# Patient Record
Sex: MALE | Race: OTHER | NOT HISPANIC OR LATINO | Employment: OTHER | URBAN - METROPOLITAN AREA
[De-identification: names, ages, dates, MRNs, and addresses within clinical notes are randomized per-mention and may not be internally consistent; named-entity substitution may affect disease eponyms.]

---

## 2023-03-21 ENCOUNTER — HOSPITAL ENCOUNTER (INPATIENT)
Facility: HOSPITAL | Age: 78
LOS: 7 days | Discharge: NURSING, REHAB OR LONG-TERM CARE | DRG: 065 | End: 2023-03-28
Attending: INTERNAL MEDICINE | Admitting: INTERNAL MEDICINE
Payer: MEDICARE

## 2023-03-21 ENCOUNTER — HOSPITAL ENCOUNTER (OUTPATIENT)
Dept: TELEMEDICINE | Facility: HOSPITAL | Age: 78
Discharge: HOME | End: 2023-03-21
Payer: MEDICARE

## 2023-03-21 DIAGNOSIS — I63.9 ACUTE CVA (CEREBROVASCULAR ACCIDENT): ICD-10-CM

## 2023-03-21 DIAGNOSIS — I63.9 STROKE: ICD-10-CM

## 2023-03-21 PROCEDURE — 25000003 PHARM REV CODE 250: Performed by: NURSE PRACTITIONER

## 2023-03-21 PROCEDURE — 11000001 HC ACUTE MED/SURG PRIVATE ROOM

## 2023-03-21 PROCEDURE — 80061 LIPID PANEL: CPT | Performed by: NURSE PRACTITIONER

## 2023-03-21 PROCEDURE — 36415 COLL VENOUS BLD VENIPUNCTURE: CPT | Performed by: NURSE PRACTITIONER

## 2023-03-21 PROCEDURE — 83036 HEMOGLOBIN GLYCOSYLATED A1C: CPT | Performed by: NURSE PRACTITIONER

## 2023-03-21 PROCEDURE — 84443 ASSAY THYROID STIM HORMONE: CPT | Performed by: NURSE PRACTITIONER

## 2023-03-21 RX ORDER — ASPIRIN 81 MG/1
81 TABLET ORAL DAILY
Status: DISCONTINUED | OUTPATIENT
Start: 2023-03-22 | End: 2023-03-28 | Stop reason: HOSPADM

## 2023-03-21 RX ORDER — ONDANSETRON 2 MG/ML
4 INJECTION INTRAMUSCULAR; INTRAVENOUS EVERY 6 HOURS PRN
Status: DISCONTINUED | OUTPATIENT
Start: 2023-03-22 | End: 2023-03-28 | Stop reason: HOSPADM

## 2023-03-21 RX ORDER — ENOXAPARIN SODIUM 100 MG/ML
40 INJECTION SUBCUTANEOUS EVERY 24 HOURS
Status: DISCONTINUED | OUTPATIENT
Start: 2023-03-22 | End: 2023-03-28 | Stop reason: HOSPADM

## 2023-03-21 RX ORDER — SODIUM CHLORIDE 0.9 % (FLUSH) 0.9 %
10 SYRINGE (ML) INJECTION
Status: DISCONTINUED | OUTPATIENT
Start: 2023-03-22 | End: 2023-03-28 | Stop reason: HOSPADM

## 2023-03-21 RX ORDER — POLYETHYLENE GLYCOL 3350 17 G/17G
17 POWDER, FOR SOLUTION ORAL DAILY
Status: DISCONTINUED | OUTPATIENT
Start: 2023-03-22 | End: 2023-03-28 | Stop reason: HOSPADM

## 2023-03-21 RX ORDER — CLOPIDOGREL BISULFATE 75 MG/1
75 TABLET ORAL DAILY
Status: DISCONTINUED | OUTPATIENT
Start: 2023-03-22 | End: 2023-03-28 | Stop reason: HOSPADM

## 2023-03-21 RX ORDER — AMOXICILLIN 250 MG
1 CAPSULE ORAL 2 TIMES DAILY
Status: DISCONTINUED | OUTPATIENT
Start: 2023-03-22 | End: 2023-03-28 | Stop reason: HOSPADM

## 2023-03-21 RX ORDER — HYDRALAZINE HYDROCHLORIDE 20 MG/ML
10 INJECTION INTRAMUSCULAR; INTRAVENOUS EVERY 6 HOURS PRN
Status: DISCONTINUED | OUTPATIENT
Start: 2023-03-22 | End: 2023-03-24

## 2023-03-21 RX ORDER — ATORVASTATIN CALCIUM 40 MG/1
40 TABLET, FILM COATED ORAL DAILY
Status: DISCONTINUED | OUTPATIENT
Start: 2023-03-21 | End: 2023-03-28 | Stop reason: HOSPADM

## 2023-03-21 RX ADMIN — ATORVASTATIN CALCIUM 40 MG: 40 TABLET, FILM COATED ORAL at 11:03

## 2023-03-21 NOTE — PROVIDER TRANSFER
Outside Transfer Acceptance Note / [facility]    Referring facility: [facility]   Referring provider: KOFFI TAVARES  Accepting facility: [facility]  Accepting provider: KENNETH LI  Reason for transfer: Higher Level of Care   Transfer diagnosis: Small Vessel Occlusion  Transfer specialty requested: Neurology  Transfer specialty notified: no  Transfer level: NUMBER 1-5: 2  Isolation status: No active isolations   Admission class or status: IP- Inpatient    Narrative     77-year-old M presented to [facility] ED today with left-sided weakness.  He said that he has not seen a physician in over 40 years.    On presentation /113.  On exam there was weakness of the left face, PERNELL and LLE.  CBC and CMP unremarkable.  Troponin and proBNP WNL.  CTH pos for small vessel disease WO acute changes.  CXR clear.  EKG NSR WO acute changes.    Per vascular neurology (Dr. Carmona) patient has had a small vessel occlusion.  TPA not indicated.  Patient given Plavix, aspirin and labetalol with improvement in his BP (211/113 > 154/90).      Transfer diagnosis CVA, hypertensive emergency    Instructions      [facility]  Admit to Hospital Medicine  Upon patient arrival to floor, please contact Hospital Medicine on call.

## 2023-03-22 PROBLEM — I63.9 ACUTE CVA (CEREBROVASCULAR ACCIDENT): Status: ACTIVE | Noted: 2023-03-22

## 2023-03-22 PROBLEM — I16.0 HYPERTENSIVE URGENCY, MALIGNANT: Status: ACTIVE | Noted: 2023-03-22

## 2023-03-22 LAB
ALBUMIN SERPL BCP-MCNC: 3.9 G/DL (ref 3.5–5.2)
ALP SERPL-CCNC: 64 U/L (ref 55–135)
ALT SERPL W/O P-5'-P-CCNC: 17 U/L (ref 10–44)
ANION GAP SERPL CALC-SCNC: 10 MMOL/L (ref 8–16)
AORTIC ROOT ANNULUS: 3.35 CM
ASCENDING AORTA: 3.48 CM
AST SERPL-CCNC: 17 U/L (ref 10–40)
AV INDEX (PROSTH): 0.74
AV MEAN GRADIENT: 9 MMHG
AV PEAK GRADIENT: 15 MMHG
AV REGURGITATION PRESSURE HALF TIME: 646.34 MS
AV VALVE AREA: 2.43 CM2
AV VELOCITY RATIO: 0.69
BASOPHILS # BLD AUTO: 0.04 K/UL (ref 0–0.2)
BASOPHILS NFR BLD: 0.4 % (ref 0–1.9)
BILIRUB SERPL-MCNC: 0.7 MG/DL (ref 0.1–1)
BILIRUB UR QL STRIP: NEGATIVE
BSA FOR ECHO PROCEDURE: 2.02 M2
BUN SERPL-MCNC: 15 MG/DL (ref 8–23)
CALCIUM SERPL-MCNC: 9 MG/DL (ref 8.7–10.5)
CHLORIDE SERPL-SCNC: 110 MMOL/L (ref 95–110)
CHOLEST SERPL-MCNC: 140 MG/DL (ref 120–199)
CHOLEST/HDLC SERPL: 4.5 {RATIO} (ref 2–5)
CLARITY UR: CLEAR
CO2 SERPL-SCNC: 22 MMOL/L (ref 23–29)
COLOR UR: YELLOW
CREAT SERPL-MCNC: 1.1 MG/DL (ref 0.5–1.4)
CV ECHO LV RWT: 0.73 CM
DIFFERENTIAL METHOD: ABNORMAL
DOP CALC AO PEAK VEL: 1.94 M/S
DOP CALC AO VTI: 32.7 CM
DOP CALC LVOT AREA: 3.3 CM2
DOP CALC LVOT DIAMETER: 2.05 CM
DOP CALC LVOT PEAK VEL: 1.34 M/S
DOP CALC LVOT STROKE VOLUME: 79.5 CM3
DOP CALC RVOT PEAK VEL: 1.17 M/S
DOP CALC RVOT VTI: 20.3 CM
DOP CALCLVOT PEAK VEL VTI: 24.1 CM
E WAVE DECELERATION TIME: 217.43 MSEC
E/A RATIO: 0.62
E/E' RATIO: 8 M/S
ECHO LV POSTERIOR WALL: 1.48 CM (ref 0.6–1.1)
EJECTION FRACTION: 60 %
EOSINOPHIL # BLD AUTO: 0 K/UL (ref 0–0.5)
EOSINOPHIL NFR BLD: 0.1 % (ref 0–8)
ERYTHROCYTE [DISTWIDTH] IN BLOOD BY AUTOMATED COUNT: 12.7 % (ref 11.5–14.5)
EST. GFR  (NO RACE VARIABLE): >60 ML/MIN/1.73 M^2
ESTIMATED AVG GLUCOSE: 105 MG/DL (ref 68–131)
FRACTIONAL SHORTENING: 35 % (ref 28–44)
GLUCOSE SERPL-MCNC: 105 MG/DL (ref 70–110)
GLUCOSE UR QL STRIP: NEGATIVE
HBA1C MFR BLD: 5.3 % (ref 4–5.6)
HCT VFR BLD AUTO: 42.2 % (ref 40–54)
HDLC SERPL-MCNC: 31 MG/DL (ref 40–75)
HDLC SERPL: 22.1 % (ref 20–50)
HGB BLD-MCNC: 14.1 G/DL (ref 14–18)
HGB UR QL STRIP: ABNORMAL
HYALINE CASTS #/AREA URNS LPF: 1 /LPF
IMM GRANULOCYTES # BLD AUTO: 0.03 K/UL (ref 0–0.04)
IMM GRANULOCYTES NFR BLD AUTO: 0.3 % (ref 0–0.5)
INR PPP: 1.1 (ref 0.8–1.2)
INTERVENTRICULAR SEPTUM: 1.89 CM (ref 0.6–1.1)
IVC DIAMETER: 1.57 CM
IVRT: 91.34 MSEC
KETONES UR QL STRIP: ABNORMAL
LA MAJOR: 4.91 CM
LA MINOR: 5.19 CM
LA WIDTH: 3.8 CM
LDLC SERPL CALC-MCNC: 80.4 MG/DL (ref 63–159)
LEFT ATRIUM SIZE: 3.7 CM
LEFT ATRIUM VOLUME INDEX: 30 ML/M2
LEFT ATRIUM VOLUME: 60.31 CM3
LEFT INTERNAL DIMENSION IN SYSTOLE: 2.64 CM (ref 2.1–4)
LEFT VENTRICLE DIASTOLIC VOLUME INDEX: 36.29 ML/M2
LEFT VENTRICLE DIASTOLIC VOLUME: 72.95 ML
LEFT VENTRICLE MASS INDEX: 143 G/M2
LEFT VENTRICLE SYSTOLIC VOLUME INDEX: 12.7 ML/M2
LEFT VENTRICLE SYSTOLIC VOLUME: 25.46 ML
LEFT VENTRICULAR INTERNAL DIMENSION IN DIASTOLE: 4.07 CM (ref 3.5–6)
LEFT VENTRICULAR MASS: 287.15 G
LEUKOCYTE ESTERASE UR QL STRIP: NEGATIVE
LV LATERAL E/E' RATIO: 7 M/S
LV SEPTAL E/E' RATIO: 9.33 M/S
LVOT MG: 4.74 MMHG
LVOT MV: 1.07 CM/S
LYMPHOCYTES # BLD AUTO: 1.5 K/UL (ref 1–4.8)
LYMPHOCYTES NFR BLD: 16.3 % (ref 18–48)
MAGNESIUM SERPL-MCNC: 2 MG/DL (ref 1.6–2.6)
MCH RBC QN AUTO: 30.8 PG (ref 27–31)
MCHC RBC AUTO-ENTMCNC: 33.4 G/DL (ref 32–36)
MCV RBC AUTO: 92 FL (ref 82–98)
MICROSCOPIC COMMENT: ABNORMAL
MONOCYTES # BLD AUTO: 0.6 K/UL (ref 0.3–1)
MONOCYTES NFR BLD: 6.7 % (ref 4–15)
MV PEAK A VEL: 0.91 M/S
MV PEAK E VEL: 0.56 M/S
MV STENOSIS PRESSURE HALF TIME: 63.05 MS
MV VALVE AREA P 1/2 METHOD: 3.49 CM2
NEUTROPHILS # BLD AUTO: 6.9 K/UL (ref 1.8–7.7)
NEUTROPHILS NFR BLD: 76.2 % (ref 38–73)
NITRITE UR QL STRIP: NEGATIVE
NONHDLC SERPL-MCNC: 109 MG/DL
NRBC BLD-RTO: 0 /100 WBC
PH UR STRIP: 6 [PH] (ref 5–8)
PHOSPHATE SERPL-MCNC: 2.6 MG/DL (ref 2.7–4.5)
PISA AR MAX VEL: 3.8 M/S
PISA TR MAX VEL: 2.43 M/S
PLATELET # BLD AUTO: 191 K/UL (ref 150–450)
PMV BLD AUTO: 10.2 FL (ref 9.2–12.9)
POTASSIUM SERPL-SCNC: 3.5 MMOL/L (ref 3.5–5.1)
PROT SERPL-MCNC: 7 G/DL (ref 6–8.4)
PROT UR QL STRIP: ABNORMAL
PROTHROMBIN TIME: 11.3 SEC (ref 9–12.5)
PV MEAN GRADIENT: 3.32 MMHG
RA MAJOR: 3.94 CM
RA PRESSURE: 3 MMHG
RA WIDTH: 2.3 CM
RBC # BLD AUTO: 4.58 M/UL (ref 4.6–6.2)
RBC #/AREA URNS HPF: 9 /HPF (ref 0–4)
SODIUM SERPL-SCNC: 142 MMOL/L (ref 136–145)
SP GR UR STRIP: 1.02 (ref 1–1.03)
STJ: 3.5 CM
TDI LATERAL: 0.08 M/S
TDI SEPTAL: 0.06 M/S
TDI: 0.07 M/S
TR MAX PG: 24 MMHG
TRICUSPID ANNULAR PLANE SYSTOLIC EXCURSION: 2 CM
TRIGL SERPL-MCNC: 143 MG/DL (ref 30–150)
TSH SERPL DL<=0.005 MIU/L-ACNC: 1.54 UIU/ML (ref 0.4–4)
TV REST PULMONARY ARTERY PRESSURE: 27 MMHG
URN SPEC COLLECT METH UR: ABNORMAL
UROBILINOGEN UR STRIP-ACNC: NEGATIVE EU/DL
WBC # BLD AUTO: 9.07 K/UL (ref 3.9–12.7)
WBC #/AREA URNS HPF: 2 /HPF (ref 0–5)

## 2023-03-22 PROCEDURE — 84100 ASSAY OF PHOSPHORUS: CPT | Performed by: NURSE PRACTITIONER

## 2023-03-22 PROCEDURE — 97530 THERAPEUTIC ACTIVITIES: CPT

## 2023-03-22 PROCEDURE — 11000001 HC ACUTE MED/SURG PRIVATE ROOM

## 2023-03-22 PROCEDURE — 25000003 PHARM REV CODE 250: Performed by: INTERNAL MEDICINE

## 2023-03-22 PROCEDURE — 25000003 PHARM REV CODE 250: Performed by: NURSE PRACTITIONER

## 2023-03-22 PROCEDURE — 97166 OT EVAL MOD COMPLEX 45 MIN: CPT

## 2023-03-22 PROCEDURE — 81000 URINALYSIS NONAUTO W/SCOPE: CPT | Performed by: NURSE PRACTITIONER

## 2023-03-22 PROCEDURE — 85610 PROTHROMBIN TIME: CPT | Performed by: NURSE PRACTITIONER

## 2023-03-22 PROCEDURE — 36415 COLL VENOUS BLD VENIPUNCTURE: CPT | Performed by: NURSE PRACTITIONER

## 2023-03-22 PROCEDURE — 92610 EVALUATE SWALLOWING FUNCTION: CPT

## 2023-03-22 PROCEDURE — 63600175 PHARM REV CODE 636 W HCPCS: Performed by: NURSE PRACTITIONER

## 2023-03-22 PROCEDURE — 99223 PR INITIAL HOSPITAL CARE,LEVL III: ICD-10-PCS | Mod: ,,, | Performed by: PSYCHIATRY & NEUROLOGY

## 2023-03-22 PROCEDURE — 83735 ASSAY OF MAGNESIUM: CPT | Performed by: NURSE PRACTITIONER

## 2023-03-22 PROCEDURE — 92523 SPEECH SOUND LANG COMPREHEN: CPT

## 2023-03-22 PROCEDURE — 99223 1ST HOSP IP/OBS HIGH 75: CPT | Mod: ,,, | Performed by: PSYCHIATRY & NEUROLOGY

## 2023-03-22 PROCEDURE — 85025 COMPLETE CBC W/AUTO DIFF WBC: CPT | Performed by: NURSE PRACTITIONER

## 2023-03-22 PROCEDURE — 94761 N-INVAS EAR/PLS OXIMETRY MLT: CPT

## 2023-03-22 PROCEDURE — 97162 PT EVAL MOD COMPLEX 30 MIN: CPT

## 2023-03-22 PROCEDURE — 80053 COMPREHEN METABOLIC PANEL: CPT | Performed by: NURSE PRACTITIONER

## 2023-03-22 RX ORDER — TALC
6 POWDER (GRAM) TOPICAL NIGHTLY PRN
Status: DISCONTINUED | OUTPATIENT
Start: 2023-03-22 | End: 2023-03-28 | Stop reason: HOSPADM

## 2023-03-22 RX ORDER — ACETAMINOPHEN 325 MG/1
650 TABLET ORAL EVERY 6 HOURS PRN
Status: DISCONTINUED | OUTPATIENT
Start: 2023-03-22 | End: 2023-03-28 | Stop reason: HOSPADM

## 2023-03-22 RX ORDER — MAG HYDROX/ALUMINUM HYD/SIMETH 200-200-20
30 SUSPENSION, ORAL (FINAL DOSE FORM) ORAL
Status: DISCONTINUED | OUTPATIENT
Start: 2023-03-22 | End: 2023-03-22

## 2023-03-22 RX ORDER — ACETAMINOPHEN 325 MG/1
325 TABLET ORAL EVERY 6 HOURS PRN
COMMUNITY

## 2023-03-22 RX ORDER — LISINOPRIL 20 MG/1
20 TABLET ORAL DAILY
Status: DISCONTINUED | OUTPATIENT
Start: 2023-03-22 | End: 2023-03-23

## 2023-03-22 RX ORDER — SIMETHICONE 80 MG
1 TABLET,CHEWABLE ORAL 3 TIMES DAILY PRN
Status: DISCONTINUED | OUTPATIENT
Start: 2023-03-22 | End: 2023-03-28 | Stop reason: HOSPADM

## 2023-03-22 RX ORDER — MUPIROCIN 20 MG/G
OINTMENT TOPICAL 2 TIMES DAILY
Status: COMPLETED | OUTPATIENT
Start: 2023-03-22 | End: 2023-03-26

## 2023-03-22 RX ORDER — MAG HYDROX/ALUMINUM HYD/SIMETH 200-200-20
30 SUSPENSION, ORAL (FINAL DOSE FORM) ORAL EVERY 6 HOURS PRN
Status: DISCONTINUED | OUTPATIENT
Start: 2023-03-22 | End: 2023-03-28 | Stop reason: HOSPADM

## 2023-03-22 RX ADMIN — LISINOPRIL 20 MG: 20 TABLET ORAL at 04:03

## 2023-03-22 RX ADMIN — MUPIROCIN: 20 OINTMENT TOPICAL at 08:03

## 2023-03-22 RX ADMIN — ENOXAPARIN SODIUM 40 MG: 40 INJECTION SUBCUTANEOUS at 04:03

## 2023-03-22 RX ADMIN — ATORVASTATIN CALCIUM 40 MG: 40 TABLET, FILM COATED ORAL at 09:03

## 2023-03-22 RX ADMIN — MUPIROCIN: 20 OINTMENT TOPICAL at 09:03

## 2023-03-22 RX ADMIN — ASPIRIN 81 MG: 81 TABLET, COATED ORAL at 09:03

## 2023-03-22 RX ADMIN — CLOPIDOGREL BISULFATE 75 MG: 75 TABLET ORAL at 09:03

## 2023-03-22 NOTE — PLAN OF CARE
Discussed poc with pt, pt verbalized understanding    Purposeful rounding every 2hours    VS wnl  Cardiac monitoring in use, pt is NSR, tele monitor # [id number]  Fall precautions in place, remains injury free      Bed locked at lowest position  Call light within reach    Chart check complete  Will cont with POC

## 2023-03-22 NOTE — CONSULTS
Food & Nutrition Education     Diet Education: Cardiac Nutrition Therapy  Time Spent: 15 minutes  Learners: Patient     Nutrition Education provided with handouts:  Cardiac-TLC Nutrition Therapy (nutritioncaremanual.org)     Comments:  RD educated patient on low sodium, general healthful diet r/t recent hospital diagnosis. Recommended a well-balanced diet with a variety of fresh foods, fruits and vegetables (5 cups/day), whole grains (3 oz/day), and fat-free or low fat dairy. Discussed reading food packages, food labels, and nutrition facts labels to identify nutrient content of foods. Discussed the importance of limiting sodium to less than 2,000 mg per day. Recommended salt free seasonings and other herbs and spices in meals to enhance flavor without additional sodium. Discussed dietary sources of cholesterol, the importance of incorporating healthy fats into the diet, and avoiding saturated and trans fats for heart health. For a generally healthy diet, aim for total fat less than 25-35% of calories. Discussed the importance of fiber (especially soluble fiber), dietary sources, and a goal intake of >20-30g/day.      NFPE not performed, pt appears well nourished.  All questions and concerns answered.  Provided handout with dietitian's contact information.  *Please re-consult as needed.  Thank You!  Blair Tovar, Registration Eligible, Provisional LDN

## 2023-03-22 NOTE — ASSESSMENT & PLAN NOTE
Patient has a current diagnosis of Hypertensive emergency with end organ damage evidenced by acute ischemic stroke which is controlled.  Latest blood pressure and vitals reviewed-   Temp:  [97.9 °F (36.6 °C)-98.4 °F (36.9 °C)]   Pulse:  [68-79]   Resp:  [16-18]   BP: (141-199)/()   SpO2:  [93 %-96 %] .   Patient currently off IV antihypertensives.   Home meds for hypertension were reviewed and noted below.       Medication adjustment for hospital antihypertensives is as follows- permissive HTN for now   IV Hydralazine for SBP>220, DBP>110    Will aim for controlled BP reduction by medications noted above. Monitor and mitigate end organ damage as indicated.

## 2023-03-22 NOTE — HOSPITAL COURSE
Surendra Lundy is a 77 year old male who was admitted to [facility] for suspected CVA. Awaiting MRI for confirmation. He was seen by PT/O.T. who recommends dual antiplatelet and STATIN. Hypertension gradually controlled.     MRI brain showed right pontine acute/subacute stroke. Neurology consulted and recommended DAPT for 30 days, then ASA alone, along with his antihypertensive medications.  PT/OT evaluated and recommended rehab placement. Initially refused placement to inpatient facility. He prefers to have outpatient.  We discussed with family the importance of rehab in order to optimize recovery.  After further discussions with family, patient was subsequently agreeable to rehab placement.  Referral initiated, [place] [facility] [place]    3/27  NESSA. Patient with improvement in left sided weakness, rehab placement pending.    3/28  Remains stable, rehab placement approved.  Patient to continue Plavix and ASA x 30 days, till 4/20/23, then continue ASA alone

## 2023-03-22 NOTE — PLAN OF CARE
Pt remains free of injury throughout the shift, safety measures remain in place.   Poc reviewed with pt. Vss, no acute s/s of distress. Medication given as ordered. Hourly rounding done. Chart reviwed, will cont with poc.     Problem: Adjustment to Illness (Stroke, Ischemic/Transient Ischemic Attack)  Goal: Optimal Coping  Outcome: Ongoing, Progressing     Problem: Adult Inpatient Plan of Care  Goal: Optimal Comfort and Wellbeing  Outcome: Ongoing, Progressing     Problem: Bowel Elimination Impaired (Stroke, Ischemic/Transient Ischemic Attack)  Goal: Effective Bowel Elimination  Outcome: Ongoing, Progressing       Problem: Functional Ability Impaired (Stroke, Ischemic/Transient Ischemic Attack)  Goal: Optimal Functional Ability  Outcome: Ongoing, Progressing     Problem: Fall Injury Risk  Goal: Absence of Fall and Fall-Related Injury  Outcome: Ongoing, Progressing     Problem: Infection  Goal: Absence of Infection Signs and Symptoms  Outcome: Ongoing, Progressing

## 2023-03-22 NOTE — ASSESSMENT & PLAN NOTE
Antithrombotics for secondary stroke prevention: Antiplatelets: Aspirin: 81 mg daily  Clopidogrel: 75 mg daily    Statins for secondary stroke prevention and hyperlipidemia, if present:   Statins: Atorvastatin- 40 mg daily    Aggressive risk factor modification: HTN     Rehab efforts: The patient has been evaluated by a stroke team provider and the therapy needs have been fully considered based off the presenting complaints and exam findings. The following therapy evaluations are needed: PT evaluate and treat, OT evaluate and treat, SLP evaluate and treat    Diagnostics ordered/pending: Carotid ultrasound to assess vasculature, HgbA1C to assess blood glucose levels, Lipid Profile to assess cholesterol levels, MRI head without contrast to assess brain parenchyma, TTE to assess cardiac function/status , TSH to assess thyroid function    VTE prophylaxis: Enoxaparin 40 mg SQ every 24 hours    BP parameters: TIA: SBP <220 until imaging confirmation of no infarct     MRI Brain pending.   CTA to assess intracranial involvement  PT/OT recommends rehab  Control blood pressure

## 2023-03-22 NOTE — SUBJECTIVE & OBJECTIVE
Interval History: awaiting MRI. Started on lisinopril 20 mg to gradually control hypertension. Discussed rehab    Review of Systems   Constitutional:  Negative for appetite change, chills, diaphoresis, fatigue and fever.   HENT:  Negative for congestion, nosebleeds, sore throat and trouble swallowing.    Eyes:  Negative for pain, discharge and visual disturbance.   Respiratory:  Negative for apnea, cough, chest tightness, shortness of breath, wheezing and stridor.    Cardiovascular:  Negative for chest pain, palpitations and leg swelling.   Gastrointestinal:  Negative for abdominal distention, abdominal pain, blood in stool, constipation, diarrhea, nausea and vomiting.   Endocrine: Negative for cold intolerance and heat intolerance.   Genitourinary:  Negative for difficulty urinating, dysuria, flank pain, frequency and urgency.   Musculoskeletal:  Negative for arthralgias, back pain, joint swelling, myalgias, neck pain and neck stiffness.   Skin:  Negative for rash and wound.   Allergic/Immunologic: Negative for food allergies and immunocompromised state.   Neurological:  Positive for dizziness, facial asymmetry (left sided facial droop), speech difficulty and weakness (left sided). Negative for seizures, syncope, light-headedness and headaches.   Hematological:  Negative for adenopathy.   Psychiatric/Behavioral:  Negative for agitation, behavioral problems and confusion. The patient is not nervous/anxious.      Objective:     Vital Signs (Most Recent):  Temp: 98.7 °F (37.1 °C) (03/22/23 1205)  Pulse: 70 (03/22/23 1205)  Resp: 18 (03/22/23 1205)  BP: (!) 174/90 (03/22/23 1205)  SpO2: 95 % (03/22/23 1205)   Vital Signs (24h Range):  Temp:  [97.9 °F (36.6 °C)-98.7 °F (37.1 °C)] 98.7 °F (37.1 °C)  Pulse:  [64-79] 70  Resp:  [18] 18  SpO2:  [93 %-96 %] 95 %  BP: (141-185)/(80-95) 174/90     Weight: 83.4 kg (183 lb 13.8 oz)  Body mass index is 26.38 kg/m².    Intake/Output Summary (Last 24 hours) at 3/22/2023 1403  Last  data filed at 3/22/2023 1004  Gross per 24 hour   Intake 120 ml   Output --   Net 120 ml      Physical Exam  Vitals and nursing note reviewed.   Constitutional:       Appearance: He is well-developed.   HENT:      Head: Normocephalic and atraumatic.      Nose: Nose normal.   Eyes:      General: No scleral icterus.  Cardiovascular:      Rate and Rhythm: Normal rate and regular rhythm.      Heart sounds: Normal heart sounds. No murmur heard.    No friction rub. No gallop.   Pulmonary:      Effort: Pulmonary effort is normal. No respiratory distress.      Breath sounds: Normal breath sounds. No wheezing.   Abdominal:      General: Bowel sounds are normal. There is no distension.      Palpations: Abdomen is soft.      Tenderness: There is no abdominal tenderness.   Musculoskeletal:         General: Normal range of motion.      Cervical back: Normal range of motion and neck supple.   Skin:     General: Skin is warm and dry.      Findings: No rash.   Neurological:      Mental Status: He is alert and oriented to person, place, and time.      Motor: Weakness (Left sided MS 4/5 to UE/BLE) present.      Comments: +left facial droop and Left arm drift    Psychiatric:         Behavior: Behavior normal.         Cognition and Memory: Impaired cognition: mild.     Significant Labs: All pertinent labs within the past 24 hours have been reviewed.  CBC:   Recent Labs   Lab 03/22/23  0628   WBC 9.07   HGB 14.1   HCT 42.2        CMP:   Recent Labs   Lab 03/22/23  0628      K 3.5      CO2 22*      BUN 15   CREATININE 1.1   CALCIUM 9.0   PROT 7.0   ALBUMIN 3.9   BILITOT 0.7   ALKPHOS 64   AST 17   ALT 17   ANIONGAP 10       Significant Imaging: I have reviewed all pertinent imaging results/findings within the past 24 hours.

## 2023-03-22 NOTE — PLAN OF CARE
SEE EVAL FOR DETAILS. PT DISPLAYED DEFICITS WITH ADL' SKILLS, DECREASE FUNCTIONAL MOBILITY, DECREASE B UE STRENGTH ENDURANCE  AS WELL AS DECREASE L UE ROM. RECOMMEND : REHAB

## 2023-03-22 NOTE — CONSULTS
[facility]  Neurology  Consult Note    Patient Name: Surendra Lundy  MRN: [id number]  Admission Date: 3/21/2023  Hospital Length of Stay: 1 days  Code Status: Full Code   Attending Provider: Natividad Jung DO   Consulting Provider: Axel Mathis MD  Primary Care Physician: Eric Adler MD  Principal Problem:Acute CVA (cerebrovascular accident)    Inpatient consult to Neurology  Consult performed by: Axel Mathis MD  Consult ordered by: Elif Schuster NP      Subjective:     Chief Complaint:  Slurred speech and left side weakness     HPI: The patient is right handed and states that he became aware of left sided weakness at least 24 hours before seeking medical attention when the weakness and speech difficulty persisted.  He was aware of difficulty with coordinated movement of the left hand, arm, and leg, with the wife also noticing the left facial weakness.    The patient states that he delayed seeking help because he has had some dizziness in the past that would remit after 24 hours, and he assumed that the weakness would also improve.    When first seen in the ER, his BP was 211/113.  He states that he did not know he had high blood pressure, but had not seen a physician for over 40 years.  He was not a TPA candidate because of the time delay.  Carotid ultrasound was normal without evidence of significant occlusion.  MRI is pending at time of this consult.    Past Medical History:   Diagnosis Date    Kidney stones     Low back pain        Past Surgical History:   Procedure Laterality Date    CHOLECYSTECTOMY         Review of patient's allergies indicates:  Not on File    Current Neurological Medications: See list below.      No current facility-administered medications on file prior to encounter.     Current Outpatient Medications on File Prior to Encounter   Medication Sig    acetaminophen (TYLENOL) 325 MG tablet Take 325 mg by mouth every 6 (six) hours as needed for Pain.      Family History        Problem Relation (Age of Onset)    Heart disease Father    Hypertension Brother    No Known Problems Mother          Tobacco Use    Smoking status: Never    Smokeless tobacco: Never   Substance and Sexual Activity    Alcohol use: Yes     Comment: socially    Drug use: Not on file    Sexual activity: Not on file     Review of Systems    ROS:  GENERAL: No fever, chills, fatigability or weight loss.  SKIN: No rashes, itching or changes in color or texture of skin.  HEAD: Denies recent head trauma.  Denies headache.  EYES: Visual acuity fine. No photophobia, ocular pain or diplopia.  EARS: Denies ear pain, discharge or vertigo.  NOSE: No loss of smell, no epistaxis or postnasal drip.  MOUTH & THROAT: No hoarseness . No excessive gum bleeding.  NODES: Denies swollen glands.  CHEST: Denies MIRANDA, cyanosis, wheezing, cough and sputum production.  CARDIOVASCULAR: Denies chest pain, PND, orthopnea or reduced exercise tolerance.  ABDOMEN: Appetite fine. No weight loss. Denies diarrhea, abdominal pain, hematemesis or blood in stool.  URINARY: No flank pain, dysuria or hematuria.  PERIPHERAL VASCULAR: No claudication or cyanosis.  MUSCULOSKELETAL: No joint stiffness or swelling. Denies back pain.  NEUROLOGIC: No history of seizures, or unexplained LOC     Objective:     Vital Signs (Most Recent):  Temp: 98.6 °F (37 °C) (03/22/23 0750)  Pulse: 70 (03/22/23 0750)  Resp: 18 (03/22/23 0750)  BP: (!) 172/85 (03/22/23 0750)  SpO2: (!) 93 % (03/22/23 0750)   Vital Signs (24h Range):  Temp:  [97.9 °F (36.6 °C)-98.6 °F (37 °C)] 98.6 °F (37 °C)  Pulse:  [64-79] 70  Resp:  [16-18] 18  SpO2:  [93 %-96 %] 93 %  BP: (141-199)/() 172/85     Weight: 83.4 kg (183 lb 13.8 oz)  Body mass index is 26.38 kg/m².    Physical Exam  APPEARANCE: Well nourished, well developed, in no acute distress as he sits in chair by side of bed.  HEAD: Normocephalic, atraumatic.  EYES: PERRL. EOMI.  Non-icteric sclerae.    NOSE: Mucosa pink. Airway  clear.  MOUTH & THROAT: No tonsillar enlargement.  Uvula is midline.  NECK: Supple. No bruits.  CHEST: Lungs clear to auscultation.  CARDIOVASCULAR: Regular rhythm without significant murmurs.  MUSCULOSKELETAL:  No bony deformity seen.   NEUROLOGIC:   Mental Status:  The patient is well oriented to person, time, place, and situation.  The patient is attentive to the environment and cooperative for the exam.  Cranial Nerves: II-XII grossly intact. Fundoscopic exam is normal.  No hemorrhage, exudate or papilledema is present. The extraocular muscles are intact in the cardinal directions of gaze.  No ptosis is present. Facial features are asymmetrical with smoothing of the left nasolabial fold and the left forehead.  Forced eye closure shows slight weakness on the left.  Speech is dysarthric. Tongue protrudes in the midline.    Gait and Station:  Patient is confined to chair, but reports falling to the left when standing.  Motor:  He is unable to bring the left arm to shoulder level with clear, immediate down drift.  Manual testing of proximal and distal muscles of the left side demonstrates diffuse weakness.  Sensory:  He reports subjective decrease in light touch, temperature on the left as compared to the right.  Cerebellar:  He could not perform finger to nose on the left.  Finger tap severely impaired on the left. No involuntary movements or tremor seen.  Reflexes:  Stretch reflexes are 2+on the right and 1+ on the left.Plantar stimulation is extensor on the left and down going on the right.       Significant Labs: CBC:   Recent Labs   Lab 03/22/23  0628   WBC 9.07   HGB 14.1   HCT 42.2        CMP:   Recent Labs   Lab 03/22/23  0628         K 3.5      CO2 22*   BUN 15   CREATININE 1.1   CALCIUM 9.0   MG 2.0   PROT 7.0   ALBUMIN 3.9   BILITOT 0.7   ALKPHOS 64   AST 17   ALT 17   ANIONGAP 10     All pertinent lab results from the past 24 hours have been reviewed.    Significant Imaging: I  have reviewed all pertinent imaging results/findings within the past 24 hours.    Assessment and Plan:      Acute stroke, right MCA distribution  Uncontrolled hypertension    RECOMMENDATIONS   Agree with studies ordered, including the MRI brain, but will also need imaging of intracranial vasculature  Consider in patient rehab.  Not certain he can be managed at home by his wife.  Control blood pressure with gradual lowering to 140/80 range.  Agree with ASA/Plavix for 30 days, then ASA 81 mg thereafter    Active Diagnoses:    Diagnosis Date Noted POA    PRINCIPAL PROBLEM:  Acute CVA (cerebrovascular accident) [I63.9] 03/22/2023 Yes    Hypertensive urgency, malignant [I16.0] 03/22/2023 Yes      Problems Resolved During this Admission:       VTE Risk Mitigation (From admission, onward)           Ordered     enoxaparin injection 40 mg  Daily         03/21/23 2327     IP VTE HIGH RISK PATIENT  Once         03/21/23 2327     Place sequential compression device  Until discontinued         03/21/23 2327                    Thank you for your consult. I will follow-up with patient. Please contact us if you have any additional questions.    Axel Mathis MD  Neurology  [facility]

## 2023-03-22 NOTE — ASSESSMENT & PLAN NOTE
Patient has a current diagnosis of Hypertensive emergency with end organ damage evidenced by acute ischemic stroke which is controlled.  Latest blood pressure and vitals reviewed-   Temp:  [97.9 °F (36.6 °C)-98.7 °F (37.1 °C)]   Pulse:  [64-79]   Resp:  [18]   BP: (141-185)/(80-95)   SpO2:  [93 %-96 %] .   Patient currently off IV antihypertensives.   Home meds for hypertension were reviewed and noted below.       Medication adjustment for hospital antihypertensives is as follows- permissive HTN for now   IV Hydralazine for SBP>220, DBP>110    Will aim for controlled BP reduction by medications noted above. Monitor and mitigate end organ damage as indicated.    3/22/23  Okay to start antihypertensives  Lisinopril 20 mg daily

## 2023-03-22 NOTE — PT/OT/SLP EVAL
Physical Therapy Evaluation    Patient Name:  Surendra Lundy   MRN:  [id number]    Recommendations:     Discharge Recommendations: rehabilitation facility   Discharge Equipment Recommendations:  (TBD AT NEXT LEVEL OF CARE)   Barriers to discharge: Decreased caregiver support    Assessment:     Surendra Lundy is a 77 y.o. male admitted with a medical diagnosis of Acute CVA (cerebrovascular accident).  He presents with the following impairments/functional limitations: weakness, impaired endurance, impaired balance, gait instability, decreased lower extremity function, decreased upper extremity function, decreased ROM, impaired self care skills, impaired functional mobility, decreased coordination, decreased safety awareness, abnormal tone, impaired fine motor .    Rehab Prognosis: Good; patient would benefit from acute skilled PT services to address these deficits and reach maximum level of function.    Recent Surgery: * No surgery found *      Plan:     During this hospitalization, patient to be seen 3 x/week to address the identified rehab impairments via gait training, therapeutic activities, therapeutic exercises and progress toward the following goals:    Plan of Care Expires:  04/05/23    Subjective     Chief Complaint: LEFT SIDED WEAKNESS   Patient/Family Comments/goals: INC STRENGTH AND GO HOME   Pain/Comfort:  Pain Rating 1: 0/10  Pain Rating Post-Intervention 1: 0/10    Patients cultural, spiritual, [employer]ism conflicts given the current situation:      Living Environment:   PT LIVES AT HOME WITH WIFE IN A ONE STORY HOME WITH 2 STEPS AND NO RAILING TO ENTER   Prior to admission, patients level of function was IND, DRIVES, AND IS RETIRED.  Equipment used at home: none.  DME owned (not currently used): rolling walker.  Upon discharge, patient will have assistance from UNKNOWN .    Objective:     Communicated with NURSE DAYNEL AND [facility] CHART REVIEW  prior to session.  Patient found supine with peripheral IV,  "telemetry  upon PT entry to room.    General Precautions: Standard, fall  Orthopedic Precautions:N/A   Braces: N/A  Respiratory Status: Room air    Exams:  Cognitive Exam:  Patient is oriented to Person, Place, Time, and Situation  Fine Motor Coordination:    -       Impaired  Left hand thumb/finger opposition skills IMPAIRED  Gross Motor Coordination:  IMPAIRED  Postural Exam:  Patient presented with the following abnormalities:    -       Rounded shoulders  -       Forward head  Sensation:    -       Intact  RLE ROM: WNL  RLE Strength: WNL  LLE ROM: LIMITED  LLE Strength: GROSSLY 3+/5    Functional Mobility:  Bed Mobility:     Supine to Sit: contact guard assistance  Transfers:     Sit to Stand:  contact guard assistance with rolling walker  Bed to Chair: minimum assistance with  rolling walker  using  Stand Pivot  Gait: PT GT TRAINED X 40' WITH RW , CUES FOR SAFETY WITH RW AND CUES FOR INC SWING OF L LE       AM-PAC 6 CLICK MOBILITY  Total Score:16       Treatment & Education:  PT EDUCATED ON ROLE OF P.T. AND ON SAFETY, PT EDUCATED ON RISK FOR FALLS DUE TO GENERALIZED WEAKNESS, EDUCATED ON "CALL DON'T FALL", ENCOURAGED TO CALL FOR ASSISTANCE WITH ALL NEEDS SUCH AS BED<>CHAIR TRANSFERS OR TRIPS TO BATHROOM.  PT COMPLETED SIT>STAND TA WITH EMPHASIS ON T/F TRAINING AND SAFETY WITH UE/ LE STRENGTHENING X 5 REPS WITH SBA.  PT SOILED AND PT SPILLED URINAL ON JEANS HOWEVER DECLINED P.T. TO ASSIST PT OUT OF SOILED CLOTHING AND IN CLEAN GOWN. PT REPORTED HE DIDN'T LIKE GOWNS AND WHEN EDUCATED ON SKIN BREAKDOWN FROM URINE ON SKIN PT REPORTED, " MY SKIN IS TOUGH. "         Patient left up in chair with call button in reach and chair alarm on.    GOALS:   Multidisciplinary Problems       Physical Therapy Goals          Problem: Physical Therapy    Goal Priority Disciplines Outcome Goal Variances Interventions   Physical Therapy Goal     PT, PT/OT      Description: LT23  1. PT WILL COMPLETE BED MOBILITY IND  2. PT WILL " GT TRAIN X 150' WITH RW AND SBA WITH INC L LE SWING   3. PT WILL T/F TO CHAIR WITH RW AND SBA                       History:     Past Medical History:   Diagnosis Date    Kidney stones     Low back pain        Past Surgical History:   Procedure Laterality Date    CHOLECYSTECTOMY         Time Tracking:     PT Received On: 03/22/23  PT Start Time: 0752     PT Stop Time: 0820  PT Total Time (min): 28 min     Billable Minutes: Evaluation 18 and Therapeutic Activity 10      03/22/2023

## 2023-03-22 NOTE — PROGRESS NOTES
[facility] Medicine  Progress Note    Patient Name: Surendra Lundy  MRN: [id number]  Patient Class: IP- Inpatient   Admission Date: 3/21/2023  Length of Stay: 1 days  Attending Physician: Natividad Jung DO  Primary Care Provider: Eric Adler MD    Subjective:     Principal Problem:Acute CVA (cerebrovascular accident)        HPI:  The patient is a 78yo male with hx kidney stones and low back pain who presented to [facility] ED 3/21/23 with left-sided weakness and dizziness. Pt reports on 3/20/23 at appox 3pm, he noticed dizziness and loss of balance. When he awoke this am, he noticed he could barely get OOB due to left sided weakness and loss of balance. He spoke to his son who noticed slurred speech. He then went to ED for evaluation.   He said that he has not seen a physician in over 40 years. Pt reports symptoms improved since arrival to ED.      In the ED, /113. On exam there was weakness of the left face, PERNELL and LLE. CBC and CMP unremarkable.Troponin and proBNP WNL..CTH showed small vessel disease w/o acute changes.CXR clear. EKG NSR w/o ischemic changes.     Per vascular neurology (Dr. Carmona) patient has had a small vessel occlusion. TPA not indicated. The patient was given Plavix, Aspirin and IV Labetalol with improvement in his BP to 154/90.  He was weaned off the Labetalol drip. Vascular neurology recommended permissive HTN for now.   Pt was transferred to [facility] with diagnosis CVA, hypertensive emergency for Neurology evaluation and further work up.     The patient will be placed in observation under hospital medicine      Overview/Hospital Course:  Surendra Lundy is a 77 year old male who was admitted to [facility] for suspected CVA. Awaiting MRI for confirmation. He was seen by PT/O.T. who recommends dual antiplatelet and STATIN. Hypertension gradually controlled.       Interval History: awaiting MRI. Started on lisinopril 20 mg to gradually control hypertension.  Discussed rehab    Review of Systems   Constitutional:  Negative for appetite change, chills, diaphoresis, fatigue and fever.   HENT:  Negative for congestion, nosebleeds, sore throat and trouble swallowing.    Eyes:  Negative for pain, discharge and visual disturbance.   Respiratory:  Negative for apnea, cough, chest tightness, shortness of breath, wheezing and stridor.    Cardiovascular:  Negative for chest pain, palpitations and leg swelling.   Gastrointestinal:  Negative for abdominal distention, abdominal pain, blood in stool, constipation, diarrhea, nausea and vomiting.   Endocrine: Negative for cold intolerance and heat intolerance.   Genitourinary:  Negative for difficulty urinating, dysuria, flank pain, frequency and urgency.   Musculoskeletal:  Negative for arthralgias, back pain, joint swelling, myalgias, neck pain and neck stiffness.   Skin:  Negative for rash and wound.   Allergic/Immunologic: Negative for food allergies and immunocompromised state.   Neurological:  Positive for dizziness, facial asymmetry (left sided facial droop), speech difficulty and weakness (left sided). Negative for seizures, syncope, light-headedness and headaches.   Hematological:  Negative for adenopathy.   Psychiatric/Behavioral:  Negative for agitation, behavioral problems and confusion. The patient is not nervous/anxious.      Objective:     Vital Signs (Most Recent):  Temp: 98.7 °F (37.1 °C) (03/22/23 1205)  Pulse: 70 (03/22/23 1205)  Resp: 18 (03/22/23 1205)  BP: (!) 174/90 (03/22/23 1205)  SpO2: 95 % (03/22/23 1205)   Vital Signs (24h Range):  Temp:  [97.9 °F (36.6 °C)-98.7 °F (37.1 °C)] 98.7 °F (37.1 °C)  Pulse:  [64-79] 70  Resp:  [18] 18  SpO2:  [93 %-96 %] 95 %  BP: (141-185)/(80-95) 174/90     Weight: 83.4 kg (183 lb 13.8 oz)  Body mass index is 26.38 kg/m².    Intake/Output Summary (Last 24 hours) at 3/22/2023 1403  Last data filed at 3/22/2023 1004  Gross per 24 hour   Intake 120 ml   Output --   Net 120 ml       Physical Exam  Vitals and nursing note reviewed.   Constitutional:       Appearance: He is well-developed.   HENT:      Head: Normocephalic and atraumatic.      Nose: Nose normal.   Eyes:      General: No scleral icterus.  Cardiovascular:      Rate and Rhythm: Normal rate and regular rhythm.      Heart sounds: Normal heart sounds. No murmur heard.    No friction rub. No gallop.   Pulmonary:      Effort: Pulmonary effort is normal. No respiratory distress.      Breath sounds: Normal breath sounds. No wheezing.   Abdominal:      General: Bowel sounds are normal. There is no distension.      Palpations: Abdomen is soft.      Tenderness: There is no abdominal tenderness.   Musculoskeletal:         General: Normal range of motion.      Cervical back: Normal range of motion and neck supple.   Skin:     General: Skin is warm and dry.      Findings: No rash.   Neurological:      Mental Status: He is alert and oriented to person, place, and time.      Motor: Weakness (Left sided MS 4/5 to UE/BLE) present.      Comments: +left facial droop and Left arm drift    Psychiatric:         Behavior: Behavior normal.         Cognition and Memory: Impaired cognition: mild.     Significant Labs: All pertinent labs within the past 24 hours have been reviewed.  CBC:   Recent Labs   Lab 03/22/23  0628   WBC 9.07   HGB 14.1   HCT 42.2        CMP:   Recent Labs   Lab 03/22/23  0628      K 3.5      CO2 22*      BUN 15   CREATININE 1.1   CALCIUM 9.0   PROT 7.0   ALBUMIN 3.9   BILITOT 0.7   ALKPHOS 64   AST 17   ALT 17   ANIONGAP 10       Significant Imaging: I have reviewed all pertinent imaging results/findings within the past 24 hours.      Assessment/Plan:      * Acute CVA (cerebrovascular accident)    Antithrombotics for secondary stroke prevention: Antiplatelets: Aspirin: 81 mg daily  Clopidogrel: 75 mg daily    Statins for secondary stroke prevention and hyperlipidemia, if present:   Statins: Atorvastatin- 40  mg daily    Aggressive risk factor modification: HTN     Rehab efforts: The patient has been evaluated by a stroke team provider and the therapy needs have been fully considered based off the presenting complaints and exam findings. The following therapy evaluations are needed: PT evaluate and treat, OT evaluate and treat, SLP evaluate and treat    Diagnostics ordered/pending: Carotid ultrasound to assess vasculature, HgbA1C to assess blood glucose levels, Lipid Profile to assess cholesterol levels, MRI head without contrast to assess brain parenchyma, TTE to assess cardiac function/status , TSH to assess thyroid function    VTE prophylaxis: Enoxaparin 40 mg SQ every 24 hours    BP parameters: TIA: SBP <220 until imaging confirmation of no infarct     MRI Brain pending.   CTA to assess intracranial involvement  PT/OT recommends rehab  Control blood pressure    Hypertensive urgency, malignant  Patient has a current diagnosis of Hypertensive emergency with end organ damage evidenced by acute ischemic stroke which is controlled.  Latest blood pressure and vitals reviewed-   Temp:  [97.9 °F (36.6 °C)-98.7 °F (37.1 °C)]   Pulse:  [64-79]   Resp:  [18]   BP: (141-185)/(80-95)   SpO2:  [93 %-96 %] .   Patient currently off IV antihypertensives.   Home meds for hypertension were reviewed and noted below.       Medication adjustment for hospital antihypertensives is as follows- permissive HTN for now   IV Hydralazine for SBP>220, DBP>110    Will aim for controlled BP reduction by medications noted above. Monitor and mitigate end organ damage as indicated.    3/22/23  Okay to start antihypertensives  Lisinopril 20 mg daily      VTE Risk Mitigation (From admission, onward)         Ordered     enoxaparin injection 40 mg  Daily         03/21/23 2327     IP VTE HIGH RISK PATIENT  Once         03/21/23 2327     Place sequential compression device  Until discontinued         03/21/23 2327                Discharge Planning   MAMTA:       Code Status: Full Code   Is the patient medically ready for discharge?:     Reason for patient still in hospital (select all that apply): Treatment  Discharge Plan A: Home with family          Elif Schuster NP  Department of Hospital Medicine   [facility] Surg

## 2023-03-22 NOTE — PT/OT/SLP EVAL
Occupational Therapy Evaluation and Treatment    Patient Name: Surendra Lundy   MRN: [id number]  Admitting Diagnosis: Acute CVA (cerebrovascular accident)   Recent Surgery: * No surgery found *      Recommendations:     Discharge Recommendations: rehabilitation facility  Level of Assistance Recommended: 24 hours significant assistance  Discharge Equipment Recommendations: prosthesis (tbd at next level of care)  Barriers to discharge:      Assessment:     Surendra Lundy is a 77 y.o. male with a medical diagnosis of Acute CVA (cerebrovascular accident). He presents with performance deficits affecting function including weakness, impaired functional mobility, decreased safety awareness, impaired endurance, gait instability, impaired balance, impaired self care skills, decreased lower extremity function.     Rehab Prognosis: Good; patient would benefit from acute skilled OT services to address these deficits and reach maximum level of function.     Plan:     Patient to be seen 2 x/week to address the above listed problems via self-care/home management, therapeutic activities, therapeutic exercises  Plan of Care Expires: 03/22/23  Plan of Care Reviewed with: patient    Subjective     Chief Complaint: No chief complaint on file.    Patient Comments/Goals:   Pain/Comfort:       Patients cultural, spiritual, Jainism conflicts given the current situation:      Social History:  Living Environment: Patient lives with their spouse in a single story home, number of outside stairs: 2-3 .  Prior Level of Function: Prior to admission, patient was independent with ADLs.  Roles and Routines: OCCUPATIONAL THERAPY  Equipment Used at Home:  none  DME owned (not currently used): wheelchair  Upon discharge, patient will have assistance from facility staff.    Objective:     Communicated with NURSE AND Epic CHART REVIEW prior to session. Patient found HOB elevated with telemetry upon OT entry to room.    General Precautions: Standard,  aspiration   Orthopedic Precautions:N/A   Braces: N/A   Respiratory Status: Room air    Cognitive and Psychosocial Function:  Oriented to: Person, Place, Time, Situation, Person, Place, Time   Follows Commands/Attention: follows two-step commands  Communication: SLIGHTLY SLURRED SPEECH  Memory: No Deficits noted  Safety Awareness/Insight to Disability: impaired   Mood/Affect/Coping skills/Emotional Control: Appropriate to situation    Hearing: Intact    Vision: Intact visual fields    Physical Exam:  Postural examination/scapula alignment:    -       No postural abnormalities identified  Skin integrity: NONE NOTED     Left UE Right UE   UE Edema None noted None noted   UE ROM AAROM WFL AROM WFL   UE Strength 3/5 4/5    Strength fair composite grasp grasp WFL   Sensation LUE INTACT:light/touch RUE INTACT: light/touch   Fine Motor Coordination:  LUE IMPAIRED: hand thumb/finger opposition skills  RUE INTACT: hand thumb/finger opposition skills    Gross Motor Coordination: LUE IMPAIRED: WFL, limited by fatigue and impaired functional endurance RUE INTACT:WFL     Occupational Performance    Bed Mobility:   Rolling/Turning to Left with minimum assistance  Rolling/Turning to Right with minimum assistance  Scooting anteriorly to EOB to have both feet planted on floor: minimum assistance  Supine to sit from right side of bed with minimum assistance  Sit to Supine with minimum assistance on left  side of bed    Functional Mobility/Transfers:  Static Sitting EOB: stand by assistance  Dynamic Sitting EOB: contact guard assistance  Static Standing Balance: minimum assistance    Dynamic Standing Balance: minimum assistance  Sit <> Stand Transfer with minimum assistance with hand-held assist  Functional Mobility: pt req hand held assist ( min a with mod) vc's sequence instruction safety and posture  Activities of Daily Living:  Lower Body Dressing: maximal assistance .    AMPAC 6 Click ADL:  AMPAC Total Score: 18    Treatment  & Education:  Patient educated on role of OT, POC and goals for therapy  Patient educated on importance of OOB activities with staff member assistance and sitting OOB majority of the day.       Patient left HOB elevated with all lines intact, call button in reach, RN notified, bed alarm on, and family present.    GOALS:   Multidisciplinary Problems       Occupational Therapy Goals          Problem: Occupational Therapy    Goal Priority Disciplines Outcome Interventions   Occupational Therapy Goal     OT, PT/OT     Description: O.T. GOALS TO BE MET BY 4-5-23  PT WILL TOLERATE 1 SET X 15 REPS B UE ROM EXERCISE TO INCREASE B UE STRENGTH/ENDURANCE AND L UE ROM  MIN A WITH LE DRESSING  SBA WITH TOILET TRANSFERS                         History:     Past Medical History:   Diagnosis Date    Kidney stones     Low back pain          Past Surgical History:   Procedure Laterality Date    CHOLECYSTECTOMY         Time Tracking:     OT Date of Treatment: 03/22/23  OT Start Time: 1511  OT Stop Time: 1534  OT Total Time (min): 23 min    Billable Minutes: Evaluation 13 MINUTES  and Therapeutic Activity 10 MINUTES

## 2023-03-22 NOTE — SUBJECTIVE & OBJECTIVE
Past Medical History:   Diagnosis Date    Kidney stones     Low back pain        Past Surgical History:   Procedure Laterality Date    CHOLECYSTECTOMY         Review of patient's allergies indicates:  Not on File    No current facility-administered medications on file prior to encounter.     Current Outpatient Medications on File Prior to Encounter   Medication Sig    acetaminophen (TYLENOL) 325 MG tablet Take 325 mg by mouth every 6 (six) hours as needed for Pain.     Family History       Problem Relation (Age of Onset)    Heart disease Father    Hypertension Brother    No Known Problems Mother          Tobacco Use    Smoking status: Never    Smokeless tobacco: Never   Substance and Sexual Activity    Alcohol use: Yes     Comment: socially    Drug use: Not on file    Sexual activity: Not on file     Review of Systems   Constitutional:  Negative for appetite change, chills, diaphoresis, fatigue and fever.   HENT:  Negative for congestion, nosebleeds, sore throat and trouble swallowing.    Eyes:  Negative for pain, discharge and visual disturbance.   Respiratory:  Negative for apnea, cough, chest tightness, shortness of breath, wheezing and stridor.    Cardiovascular:  Negative for chest pain, palpitations and leg swelling.   Gastrointestinal:  Negative for abdominal distention, abdominal pain, blood in stool, constipation, diarrhea, nausea and vomiting.   Endocrine: Negative for cold intolerance and heat intolerance.   Genitourinary:  Negative for difficulty urinating, dysuria, flank pain, frequency and urgency.   Musculoskeletal:  Negative for arthralgias, back pain, joint swelling, myalgias, neck pain and neck stiffness.   Skin:  Negative for rash and wound.   Allergic/Immunologic: Negative for food allergies and immunocompromised state.   Neurological:  Positive for dizziness, facial asymmetry (left sided facial droop), speech difficulty and weakness (left sided). Negative for seizures, syncope, light-headedness  and headaches.   Hematological:  Negative for adenopathy.   Psychiatric/Behavioral:  Negative for agitation, behavioral problems and confusion. The patient is not nervous/anxious.    Objective:     Vital Signs (Most Recent):  Temp: 97.9 °F (36.6 °C) (03/21/23 2359)  Pulse: 79 (03/21/23 2359)  Resp: 18 (03/21/23 2359)  BP: (!) 185/88 (03/21/23 2359)  SpO2: (!) 93 % (03/21/23 2359)   Vital Signs (24h Range):  Temp:  [97.9 °F (36.6 °C)-98.4 °F (36.9 °C)] 97.9 °F (36.6 °C)  Pulse:  [68-79] 79  Resp:  [16-18] 18  SpO2:  [93 %-96 %] 93 %  BP: (141-199)/() 185/88     Weight: 83.4 kg (183 lb 13.8 oz)  Body mass index is 26.38 kg/m².    Physical Exam  Vitals and nursing note reviewed.   Constitutional:       Appearance: He is well-developed.   HENT:      Head: Normocephalic and atraumatic.      Nose: Nose normal.   Eyes:      General: No scleral icterus.  Cardiovascular:      Rate and Rhythm: Normal rate and regular rhythm.      Heart sounds: Normal heart sounds. No murmur heard.    No friction rub. No gallop.   Pulmonary:      Effort: Pulmonary effort is normal. No respiratory distress.      Breath sounds: Normal breath sounds. No wheezing.   Abdominal:      General: Bowel sounds are normal. There is no distension.      Palpations: Abdomen is soft.      Tenderness: There is no abdominal tenderness.   Musculoskeletal:         General: Normal range of motion.      Cervical back: Normal range of motion and neck supple.   Skin:     General: Skin is warm and dry.      Findings: No rash.   Neurological:      Mental Status: He is alert and oriented to person, place, and time.      Motor: Weakness (Left sided MS 4/5 to UE/BLE) present.      Comments: +left facial droop and Left arm drift    Psychiatric:         Behavior: Behavior normal.         Cognition and Memory: Cognition is impaired (mild).           Significant Labs: All pertinent labs within the past 24 hours have been reviewed.    Significant Imaging: I have reviewed  all pertinent imaging results/findings within the past 24 hours.

## 2023-03-22 NOTE — PLAN OF CARE
SW spoke with pt regarding SNF Placement. Pt stated he doesn't want SNF or anything inpatient. Pt stated he wants outpatient PT/OT/ SW will consult with MD regarding this matter.

## 2023-03-22 NOTE — PLAN OF CARE
[facility] - Med Surg  Initial Discharge Assessment       Primary Care Provider: Eric Adler MD    Admission Diagnosis: Stroke [I63.9]  Stroke [I63.9]    Admission Date: 3/21/2023  Expected Discharge Date: per attending         Payor: [employer] MEDICARE / Plan: [employer] MEDICARE HMO / Product Type: Capitation /     Extended Emergency Contact Information  Primary Emergency Contact: Shayna Lundy  Mobile Phone: [phone]  Relation: Spouse  Preferred language: English   needed? No  Secondary Emergency Contact: Nilesh Lundy  Mobile Phone: [phone]  Relation: Son  Preferred language: English   needed? No    Discharge Plan A: Home with family       No Pharmacies Listed    Initial Assessment (most recent)       Adult Discharge Assessment - 03/22/23 1056          Discharge Assessment    Assessment Type Discharge Planning Assessment     Confirmed/corrected address, phone number and insurance Yes     Confirmed Demographics Correct on Facesheet     Source of Information patient     When was your last doctors appointment? --   few years    Communicated MAMTA with patient/caregiver Date not available/Unable to determine     Reason For Admission stroke     Do you have help at home or someone to help you manage your care at home? Yes     Who are your caregiver(s) and their phone number(s)? spouse     Prior to hospitilization cognitive status: Alert/Oriented     Current cognitive status: Alert/Oriented     Equipment Currently Used at Home none     Readmission within 30 days? No     Patient currently being followed by outpatient case management? No     Do you currently have service(s) that help you manage your care at home? No     Do you take prescription medications? Yes     Do you have prescription coverage? Yes     Coverage [employer]     Do you have any problems affording any of your prescribed medications? No     Is the patient taking medications as prescribed? yes     Who is going to help you get home at  discharge? spouse and son     Are you on dialysis? No     Do you take coumadin? No     Discharge Plan A Home with family                 SW to f/u as needed.

## 2023-03-22 NOTE — PT/OT/SLP EVAL
Speech Language Pathology Evaluation  Cognitive/Bedside Swallow    Patient Name:  Surendra Lundy   MRN:  [id number]  Admitting Diagnosis: Acute CVA (cerebrovascular accident)    Recommendations:                  General Recommendations:  Speech/language therapy and Cognitive-linguistic therapy  Diet recommendations:  Regular Diet - IDDSI Level 7, Thin liquids - IDDSI Level 0   Aspiration Precautions: Check for pocketing/oral residue, Frequent oral care, HOB to 90 degrees, Remain upright 30 minutes post meal, Small bites/sips, and Standard aspiration precautions   General Precautions: Standard, aspiration  Communication strategies:   careful listening needed d/t dysarthria    History:     Past Medical History:   Diagnosis Date    Kidney stones     Low back pain        Past Surgical History:   Procedure Laterality Date    CHOLECYSTECTOMY         Social History: Patient lives with wife at home in [place], [place].  Independent with ADL's.  Retired from working at the [id number] in [place].    Prior Intubation HX:  N/A    Modified Barium Swallow: N/A    [place] [facility] N/A on [facility].  MRI pending.    Prior diet: Pt consumes a regular diet.  Denied dysphagia hx or current symptoms of dysphagia.    Subjective     Pt seen bedside for ST evaluation.  Sitting upright in bedside chair.  Urine smell noted in room--pt reportedly spilled urine from urinal on pants but refused to change into gown.  No family present.  No c/o pain.  Pt stated his family members works at a SNF in [place], and he would consider going there--he wants rehab closer to home.  Patient goals: To go home    Pain/Comfort:  Pain Rating 1: 0/10  Pain Rating Post-Intervention 1: 0/10  Pain Rating 2: 0/10  Pain Rating Post-Intervention 2: 0/10    Respiratory Status: Room air    Objective:     Cognitive Status:    Oriented x4.  Lack of deficit awareness and level of assist needed at this time.  Impaired recent memory; however,  "pt reported "I could never remember anything."  Able to meet functional communicative needs.       Receptive Language:   Comprehension:   WFL    Pragmatics:    WFL    Expressive Language:  Verbal:    WFL    Motor Speech:  Mild dysarthria present with left OM weakness/asymmetry    Voice:   WFL    Visual-Spatial:  WFL    Reading:   WFL --words, phrases and sentences, clock    Oral Musculature Evaluation  Oral Musculature: facial asymmetry present, left weakness  Dentition: present and adequate  Secretion Management: adequate  Mucosal Quality: good  Mandibular Strength and Mobility: WFL  Oral Labial Strength and Mobility: impaired coordination, impaired retraction  Lingual Strength and Mobility: WFL  Velar Elevation: WFL  Buccal Strength and Mobility: decreased tone  Volitional Cough: present  Volitional Swallow: present  Voice Prior to PO Intake: WF    Bedside Swallow Eval:   Consistencies Assessed:  Pt consumed lunch meal during bedside CSE, which consisted of IDDSI 7 (regular solids) and IDDSI 0 (thin liquids).  Hamburger, macaroni and cheese, coke    Oral Phase:   Cleared oral/left buccal residue with thin liquid rinse independently.  Slow oral transit time    Pharyngeal Phase:   no overt clinical signs/symptoms of aspiration    Compensatory Strategies  Aspiration precautions  Oral care/hygiene    Treatment:  Pt recommended for continued IDDSI 7 (regular solids) and IDDSI 0 (thin liquids), following aspiration precautions and post-acute/rehab intervention for cognitive-communicative deficits s/p CVA.    Assessment:     Surendra Lundy is a 77 y.o. male with an SLP diagnosis of  left-OM weakness/asymmetry, dysarthria of speech and mild cognitive impairment s/p suspected CVA--MRI pending .  No overt s/s of dysphagia and pt recommended for continued IDDSI 7 (regular solids) with IDDSI 0 (thin liquids), following aspiration precautions and daily oral care/hygiene.  Pt would benefit from continued ST post-acute/rehab " for cognitive-communicative impairment.     Goals:   Multidisciplinary Problems       SLP Goals          Problem: SLP    Goal Priority Disciplines Outcome   SLP Goal     SLP    Description: 1.  Pt will consume IDDSI 7 (regular solids) with IDDSI 0 (thin liquids) without incident.    2.  Pt will improve cognitive-communicative skills to baseline level of function/independence related to speech intelligibility, memory recall and executive functioning.                       Plan:     Patient to be seen:  2 x/week, 3 x/week   Plan of Care expires:  03/29/23  Plan of Care reviewed with:  patient   SLP Follow-Up:  Yes       Discharge recommendations:  Discharge Facility/Level of Care Needs: rehabilitation facility   Barriers to Discharge:  None    Time Tracking:     SLP Treatment Date:   03/22/23  Speech Start Time:  1230  Speech Stop Time:  1300     Speech Total Time (min):  30 min    Billable Minutes: Eval 15 minutes  and Eval Swallow and Oral Function 15 minutes    03/22/2023

## 2023-03-22 NOTE — HPI
The patient is a 76yo male with hx kidney stones and low back pain who presented to [facility] ED 3/21/23 with left-sided weakness and dizziness. Pt reports on 3/20/23 at appox 3pm, he noticed dizziness and loss of balance. When he awoke this am, he noticed he could barely get OOB due to left sided weakness and loss of balance. He spoke to his son who noticed slurred speech. He then went to ED for evaluation.   He said that he has not seen a physician in over 40 years. Pt reports symptoms improved since arrival to ED.      In the ED, /113. On exam there was weakness of the left face, PERNELL and LLE. CBC and CMP unremarkable.Troponin and proBNP WNL..CTH showed small vessel disease w/o acute changes.CXR clear. EKG NSR w/o ischemic changes.     Per vascular neurology (Dr. Carmona) patient has had a small vessel occlusion. TPA not indicated. The patient was given Plavix, Aspirin and IV Labetalol with improvement in his BP to 154/90.  He was weaned off the Labetalol drip. Vascular neurology recommended permissive HTN for now.   Pt was transferred to [facility] with diagnosis CVA, hypertensive emergency for Neurology evaluation and further work up.     The patient will be placed in observation under hospital medicine

## 2023-03-23 LAB
ALBUMIN SERPL BCP-MCNC: 3.8 G/DL (ref 3.5–5.2)
ALP SERPL-CCNC: 67 U/L (ref 55–135)
ALT SERPL W/O P-5'-P-CCNC: 20 U/L (ref 10–44)
ANION GAP SERPL CALC-SCNC: 10 MMOL/L (ref 8–16)
AST SERPL-CCNC: 36 U/L (ref 10–40)
BASOPHILS # BLD AUTO: 0.05 K/UL (ref 0–0.2)
BASOPHILS NFR BLD: 0.6 % (ref 0–1.9)
BILIRUB SERPL-MCNC: 0.8 MG/DL (ref 0.1–1)
BUN SERPL-MCNC: 18 MG/DL (ref 8–23)
CALCIUM SERPL-MCNC: 8.8 MG/DL (ref 8.7–10.5)
CHLORIDE SERPL-SCNC: 111 MMOL/L (ref 95–110)
CO2 SERPL-SCNC: 20 MMOL/L (ref 23–29)
CREAT SERPL-MCNC: 1 MG/DL (ref 0.5–1.4)
DIFFERENTIAL METHOD: NORMAL
EOSINOPHIL # BLD AUTO: 0 K/UL (ref 0–0.5)
EOSINOPHIL NFR BLD: 0.5 % (ref 0–8)
ERYTHROCYTE [DISTWIDTH] IN BLOOD BY AUTOMATED COUNT: 12.7 % (ref 11.5–14.5)
EST. GFR  (NO RACE VARIABLE): >60 ML/MIN/1.73 M^2
GLUCOSE SERPL-MCNC: 108 MG/DL (ref 70–110)
HCT VFR BLD AUTO: 44.4 % (ref 40–54)
HGB BLD-MCNC: 14.8 G/DL (ref 14–18)
IMM GRANULOCYTES # BLD AUTO: 0.02 K/UL (ref 0–0.04)
IMM GRANULOCYTES NFR BLD AUTO: 0.2 % (ref 0–0.5)
LYMPHOCYTES # BLD AUTO: 1.7 K/UL (ref 1–4.8)
LYMPHOCYTES NFR BLD: 20.3 % (ref 18–48)
MCH RBC QN AUTO: 30.7 PG (ref 27–31)
MCHC RBC AUTO-ENTMCNC: 33.3 G/DL (ref 32–36)
MCV RBC AUTO: 92 FL (ref 82–98)
MONOCYTES # BLD AUTO: 0.6 K/UL (ref 0.3–1)
MONOCYTES NFR BLD: 7.6 % (ref 4–15)
NEUTROPHILS # BLD AUTO: 5.9 K/UL (ref 1.8–7.7)
NEUTROPHILS NFR BLD: 70.8 % (ref 38–73)
NRBC BLD-RTO: 0 /100 WBC
PLATELET # BLD AUTO: 197 K/UL (ref 150–450)
PMV BLD AUTO: 10.3 FL (ref 9.2–12.9)
POTASSIUM SERPL-SCNC: 3.3 MMOL/L (ref 3.5–5.1)
PROT SERPL-MCNC: 7.2 G/DL (ref 6–8.4)
RBC # BLD AUTO: 4.82 M/UL (ref 4.6–6.2)
SODIUM SERPL-SCNC: 141 MMOL/L (ref 136–145)
WBC # BLD AUTO: 8.28 K/UL (ref 3.9–12.7)

## 2023-03-23 PROCEDURE — 97530 THERAPEUTIC ACTIVITIES: CPT

## 2023-03-23 PROCEDURE — 63600175 PHARM REV CODE 636 W HCPCS: Performed by: NURSE PRACTITIONER

## 2023-03-23 PROCEDURE — 85025 COMPLETE CBC W/AUTO DIFF WBC: CPT | Performed by: NURSE PRACTITIONER

## 2023-03-23 PROCEDURE — 11000001 HC ACUTE MED/SURG PRIVATE ROOM

## 2023-03-23 PROCEDURE — 25500020 PHARM REV CODE 255: Performed by: INTERNAL MEDICINE

## 2023-03-23 PROCEDURE — 25000003 PHARM REV CODE 250: Performed by: NURSE PRACTITIONER

## 2023-03-23 PROCEDURE — 99232 SBSQ HOSP IP/OBS MODERATE 35: CPT | Mod: ,,, | Performed by: PSYCHIATRY & NEUROLOGY

## 2023-03-23 PROCEDURE — 92507 TX SP LANG VOICE COMM INDIV: CPT

## 2023-03-23 PROCEDURE — 36415 COLL VENOUS BLD VENIPUNCTURE: CPT | Performed by: NURSE PRACTITIONER

## 2023-03-23 PROCEDURE — 80053 COMPREHEN METABOLIC PANEL: CPT | Performed by: NURSE PRACTITIONER

## 2023-03-23 PROCEDURE — 99232 PR SUBSEQUENT HOSPITAL CARE,LEVL II: ICD-10-PCS | Mod: ,,, | Performed by: PSYCHIATRY & NEUROLOGY

## 2023-03-23 PROCEDURE — 97116 GAIT TRAINING THERAPY: CPT

## 2023-03-23 PROCEDURE — 92526 ORAL FUNCTION THERAPY: CPT

## 2023-03-23 RX ORDER — LISINOPRIL 20 MG/1
20 TABLET ORAL ONCE
Status: COMPLETED | OUTPATIENT
Start: 2023-03-23 | End: 2023-03-23

## 2023-03-23 RX ORDER — LISINOPRIL 20 MG/1
40 TABLET ORAL DAILY
Status: DISCONTINUED | OUTPATIENT
Start: 2023-03-24 | End: 2023-03-28 | Stop reason: HOSPADM

## 2023-03-23 RX ADMIN — MUPIROCIN: 20 OINTMENT TOPICAL at 09:03

## 2023-03-23 RX ADMIN — MUPIROCIN: 20 OINTMENT TOPICAL at 08:03

## 2023-03-23 RX ADMIN — ASPIRIN 81 MG: 81 TABLET, COATED ORAL at 09:03

## 2023-03-23 RX ADMIN — ENOXAPARIN SODIUM 40 MG: 40 INJECTION SUBCUTANEOUS at 05:03

## 2023-03-23 RX ADMIN — LISINOPRIL 20 MG: 20 TABLET ORAL at 09:03

## 2023-03-23 RX ADMIN — SENNOSIDES AND DOCUSATE SODIUM 1 TABLET: 50; 8.6 TABLET ORAL at 09:03

## 2023-03-23 RX ADMIN — HYDRALAZINE HYDROCHLORIDE 10 MG: 20 INJECTION, SOLUTION INTRAMUSCULAR; INTRAVENOUS at 04:03

## 2023-03-23 RX ADMIN — ATORVASTATIN CALCIUM 40 MG: 40 TABLET, FILM COATED ORAL at 09:03

## 2023-03-23 RX ADMIN — CLOPIDOGREL BISULFATE 75 MG: 75 TABLET ORAL at 09:03

## 2023-03-23 RX ADMIN — LISINOPRIL 20 MG: 20 TABLET ORAL at 02:03

## 2023-03-23 RX ADMIN — HYDRALAZINE HYDROCHLORIDE 10 MG: 20 INJECTION, SOLUTION INTRAMUSCULAR; INTRAVENOUS at 11:03

## 2023-03-23 RX ADMIN — IOHEXOL 100 ML: 350 INJECTION, SOLUTION INTRAVENOUS at 11:03

## 2023-03-23 RX ADMIN — SENNOSIDES AND DOCUSATE SODIUM 1 TABLET: 50; 8.6 TABLET ORAL at 08:03

## 2023-03-23 NOTE — PT/OT/SLP PROGRESS
Speech Language Pathology Treatment    Patient Name:  Surendra Lundy   MRN:  [id number]  Admitting Diagnosis: Acute CVA (cerebrovascular accident)    Recommendations:                 General Recommendations:  Speech/language therapy and Cognitive-linguistic therapy  Diet recommendations:  Regular Diet - IDDSI Level 7, Liquid Diet Level: Thin liquids - IDDSI Level 0   Aspiration Precautions: Avoid talking while eating, Check for pocketing/oral residue, Frequent oral care, HOB to 90 degrees, Remain upright 30 minutes post meal, Small bites/sips, and Standard aspiration precautions   General Precautions: Standard, aspiration  Communication strategies:   careful listening s/t dysarthria    Subjective     Pt seen bedside for ST.  No c/o pain.  Ready to go home, despite tx recs for IP rehab.  No family present.  Patient goals: to go home    Pain/Comfort:  Pain Rating 1: 0/10  Pain Rating Post-Intervention 1: 0/10  Pain Rating 2: 0/10  Pain Rating Post-Intervention 2: 0/10    Respiratory Status: Room air    Objective:     Has the patient been evaluated by SLP for swallowing?   Yes  Keep patient NPO? No   Current Respiratory Status: room air     Pt consuming IDDSI 7 (regular solids)/IDDSI 0 (thin liquids) without incident.  Educated on swallowing precautions/strategies to maximize safety/efficiency during PO consumption.    Conversational speech tasks--education/demonstration of speech strategies to improve communicative effectiveness in the setting of left sided OM weakness s/t CVA.    MRI BRAIN WITHOUT CONTRAST     CLINICAL HISTORY:  Stroke, follow up;     TECHNIQUE:  Multisequence multiplanar imaging noncontrast     COMPARISON:  None available     FINDINGS:  There is an ischemic infarct right pontine small to moderate size.  No sizable mass effect or hemorrhagic transformation.     Underlying chronic microangiopathic change.     Ventricles nondistended     Impression:     Right pontine infarct acute or subacute         Electronically signed by: Jennifer Rg  Date:                                            03/22/2023  Time:                                           22:12    Assessment:     Surendra Lundy is a 77 y.o. male with an SLP diagnosis of (left) OM weakness/asymmetry, dysarthria of speech and mild cognitive impairment s/p confirmed CVA on MRI .  Pt consuming IDDSI 7 (regular solids) with IDDSI 0 (thin liquids) without incident, following aspiration precautions and daily oral care/hygiene.  Pt would benefit from continued ST post-acute/rehab for cognitive-communicative impairment.      Goals:   Multidisciplinary Problems       SLP Goals          Problem: SLP    Goal Priority Disciplines Outcome   SLP Goal     SLP Ongoing, Progressing   Description: 1.  Pt will consume IDDSI 7 (regular solids) with IDDSI 0 (thin liquids) without incident.    2.  Pt will improve cognitive-communicative skills to baseline level of function/independence related to speech intelligibility, memory recall and executive functioning.                       Plan:     Patient to be seen:  2 x/week, 3 x/week   Plan of Care expires:  03/29/23  Plan of Care reviewed with:  patient   SLP Follow-Up:  Yes       Discharge recommendations:  rehabilitation facility   Barriers to Discharge:  None    Time Tracking:     SLP Treatment Date:   03/23/23  Speech Start Time:  1500  Speech Stop Time:  1530     Speech Total Time (min):  30 min    Billable Minutes: Speech Therapy Individual 15 minutes and Treatment Swallowing Dysfunction 15 minutes    03/23/2023

## 2023-03-23 NOTE — HPI
76 y/o man with no sig PMH presenting with L sided weakness and lightheadness.   He reports symptoms started yesterday at 3 pm and have been stable since then.  Does not take any blood thinners at home.

## 2023-03-23 NOTE — PT/OT/SLP PROGRESS
"Physical Therapy  Treatment    Surendra Lundy   MRN: [id number]   Admitting Diagnosis: Acute CVA (cerebrovascular accident)    PT Received On: 03/23/23  PT Start Time: 0819     PT Stop Time: 0845    PT Total Time (min): 26 min       Billable Minutes:  Gait Training 16 and Therapeutic Activity 10    Treatment Type: Treatment  PT/PTA: PT     Number of PTA visits since last PT visit: 0       General Precautions: Standard, fall  Orthopedic Precautions: N/A  Braces: N/A  Respiratory Status: Room air         Subjective:  Communicated with NURSE GALICIA AND [facility] CHART REVIEW  prior to session.   PT AGREED TO TX     Pain/Comfort  Pain Rating 1: 0/10  Pain Rating Post-Intervention 1: 0/10    Objective:   Patient found with: peripheral IV, telemetry    Functional Mobility:  PT MET IN  SUP IN BED. PT AGREED TO TX HOWEVER REPORTED, " I AM READY TO GO HOME." PT SUP.SIT EOB WITH SBA AND LEFT LATERAL LEAN INITIALLY. PT STOOD WITH RW AND CUES FOR  OF L UE WITH RW. PT GT TRAINED WITH DEC STEP LENGTH OF L LE AND CUES FOR TO PROGRESS GT AND POSTURAL CUES . PT GT TRAINED 3X55' WITH STANDING REST BREAKS. PT RETURNED TO  TO BATHROOM FOR TOILETING WITH CGA ON AND OFF COMMODE AND ASSIST WITH DOFFING SOILED GARMENT AND DONNING CLEAN UNDERGARMENT. PT T/F TO BEDSIDE CHAIR WITH RW AND CGA. PT COMPLETED 2X5 REPS OF SIT >STANDS WITH SBA WITH EMPHASIS ON U LE WB AND SAFETY WITH T/F TRAINING.     Treatment and Education:  PT EDUCATED ON RISK FOR FALLS DUE TO GENERALIZED WEAKNESS, EDUCATED ON "CALL DON'T FALL", ENCOURAGED TO CALL FOR ASSISTANCE WITH ALL NEEDS SUCH AS BED<>CHAIR TRANSFERS OR TRIPS TO BATHROOM.       AM-PAC 6 CLICK MOBILITY  How much help from another person does this patient currently need?   1 = Unable, Total/Dependent Assistance  2 = A lot, Maximum/Moderate Assistance  3 = A little, Minimum/Contact Guard/Supervision  4 = None, Modified [place]/Independent    Turning over in bed (including adjusting bedclothes, sheets and " blankets)?: 4  Sitting down on and standing up from a chair with arms (e.g., wheelchair, bedside commode, etc.): 3  Moving from lying on back to sitting on the side of the bed?: 3  Moving to and from a bed to a chair (including a wheelchair)?: 3  Climbing 3-5 steps with a railing?: 1    AM-PAC Raw Score CMS G-Code Modifier Level of Impairment Assistance   6 % Total / Unable   7 - 9 CM 80 - 100% Maximal Assist   10 - 14 CL 60 - 80% Moderate Assist   15 - 19 CK 40 - 60% Moderate Assist   20 - 22 CJ 20 - 40% Minimal Assist   23 CI 1-20% SBA / CGA   24 CH 0% Independent/ Mod I     Patient left up in chair with call button in reach and chair alarm on.    Assessment:  PT PROGRESSING WITH GT AND GROSS FUNC MOBILITY HOWEVER LEFT SIDE IMPAIRMENTS AND MANAGEMENT OF DAILY ROUTINE SAFELY CONT TO LIMIT TO AND RESULTS AS A HIGH FALL RISK.     Rehab identified problem list/impairments: weakness, gait instability, impaired balance, impaired endurance, decreased coordination, impaired functional mobility, impaired self care skills, decreased safety awareness, decreased lower extremity function, decreased ROM, impaired coordination, decreased upper extremity function    Rehab potential is good.    Activity tolerance: Good    Discharge recommendations: rehabilitation facility      Barriers to discharge:      Equipment recommendations: other (see comments) (TBD AT NEXT LEVEL OF CARE)     GOALS:   Multidisciplinary Problems       Physical Therapy Goals          Problem: Physical Therapy    Goal Priority Disciplines Outcome Goal Variances Interventions   Physical Therapy Goal     PT, PT/OT Ongoing, Progressing     Description: LT23  1. PT WILL COMPLETE BED MOBILITY IND  2. PT WILL GT TRAIN X 150' WITH RW AND SBA WITH INC L LE SWING   3. PT WILL T/F TO CHAIR WITH RW AND SBA                       PLAN:    Patient to be seen 3 x/week to address the above listed problems via gait training, therapeutic activities, therapeutic  exercises, neuromuscular re-education  Plan of Care expires: 04/05/23  Plan of Care reviewed with: patient         03/23/2023

## 2023-03-23 NOTE — SUBJECTIVE & OBJECTIVE
Woke up with symptoms?: {YES NO:58262}    Recent bleeding noted: {Yes / No / Unknown:74}  Does the patient take any Blood Thinners? {Yes / No / Unknown:74}  Medications: {[facility] Medications:42294}      Past Medical History: {[facility] MEDICAL HX:18624}    Past Surgical History: {[facility] SURGICAL HX:43548}    Family History: {[facility] MEDICAL HX:15502}    Social History: {[facility] SOCIAL HX:79213}    Allergies: No Known Allergies {[facility] ALLERGIES:89023}    Review of Systems   Neurological: Positive for weakness and light-headedness.   All other systems reviewed and are negative.    Objective:   Vitals: There were no vitals taken for this visit. {[facility] VITALS:84501}    CT READ: {[facility] CT READ:83177}    Physical Exam  Constitutional:       General: He is not in acute distress.  HENT:      Head: Normocephalic and atraumatic.   Eyes:      Pupils: Pupils are equal, round, and reactive to light.   Pulmonary:      Effort: Pulmonary effort is normal.

## 2023-03-23 NOTE — PLAN OF CARE
Pt remains free of injury throughout the shift, safety measures remain in place.   Poc reviewed with pt. Vss, no acute s/s of distress. Medication given as ordered. PRN medication given for BP. Hourly rounding done. Chart reviwed, will cont with poc.       Problem: Adult Inpatient Plan of Care  Goal: Optimal Comfort and Wellbeing  Outcome: Ongoing, Progressing     Problem: Adjustment to Illness (Stroke, Ischemic/Transient Ischemic Attack)  Goal: Optimal Coping  Outcome: Ongoing, Progressing     Problem: Cognitive Impairment (Stroke, Ischemic/Transient Ischemic Attack)  Goal: Optimal Cognitive Function  Outcome: Ongoing, Progressing     Problem: Fall Injury Risk  Goal: Absence of Fall and Fall-Related Injury  Outcome: Ongoing, Progressing     Problem: Infection  Goal: Absence of Infection Signs and Symptoms  Outcome: Ongoing, Progressing

## 2023-03-23 NOTE — ASSESSMENT & PLAN NOTE
Patient has a current diagnosis of Hypertensive emergency with end organ damage evidenced by acute ischemic stroke which is controlled.  Latest blood pressure and vitals reviewed-   Temp:  [97.6 °F (36.4 °C)-99.7 °F (37.6 °C)]   Pulse:  [66-96]   Resp:  [18-19]   BP: (185-231)/()   SpO2:  [96 %-98 %] .   Patient currently off IV antihypertensives.   Home meds for hypertension were reviewed and noted below.       Medication adjustment for hospital antihypertensives is as follows- permissive HTN for now   IV Hydralazine for SBP>220, DBP>110    Will aim for controlled BP reduction by medications noted above. Monitor and mitigate end organ damage as indicated.    3/22/23  Okay to start antihypertensives  Lisinopril 20 mg daily    3/23/23  SBP in the 180s  Increase Lisinopril to 40 mg daily  Monitor

## 2023-03-23 NOTE — PLAN OF CARE
Discussed poc with pt, pt verbalized understanding    Purposeful rounding every 2hours    VS wnl  Cardiac monitoring in use, pt is NSR, tele monitor # [id number]  Fall precautions in place, remains injury free  Pain under control with PRN meds    Urine has cleared up; yellow in color no longer red in color.  Accurate I&Os  Bed locked at lowest position  Call light within reach    Chart check complete  Will cont with POC

## 2023-03-23 NOTE — PT/OT/SLP PROGRESS
GOT -2 Occupational Therapy      Patient Name:  Surendra Lundy   MRN:  [id number]  S: PT COOPERATIVE WITH  PERFORMING ADDITIONAL TESTING  O. PT SEEN IN ROOM SUPINE IN BED. PT ASKED TO PERFORM SENSATION TESTING ON LEFT UE. PT WFL OF LIGHT TOUCH AS COMPARED TO R UE IS WFL. PT EDUCATED ON  SITTING EOB FOR ALL MEALS . PT EDUCATED ON FM COORDINATION ACTIVITY. NOTED DEFICITS WITH LEFT HAND/DIGITS PT EDUCATED ON BUE HEP  Physical Exam:  Postural examination/scapula alignment:    -       No postural abnormalities identified  Skin integrity: NONE NOTED     Left UE Right UE   UE Edema None noted None noted   UE ROM AAROM WFL AROM WFL   UE Strength 3/5 4/5    Strength fair composite grasp grasp WFL   Sensation LUE INTACT:light/touch RUE INTACT: light/touch   Fine Motor Coordination:  LUE IMPAIRED: hand thumb/finger opposition skills  RUE INTACT: hand thumb/finger opposition skills    Gross Motor Coordination: LUE IMPAIRED: WFL, limited by fatigue and impaired functional endurance RUE INTACT:WFL     A: PT DISPLAYED DEFICITS WITH FM COORDINATION ACTIVITY  RIP STRENGTH / ENDURANCE AND NO DEFICITS NOTED WITH SENSATION ACTIVITY. PT VERBALIZED UNDERSTANDING OF B UE HEP.  P:CONTINUE WITH POC.  3/22/2023  1 TA  [id number]

## 2023-03-23 NOTE — PLAN OF CARE
PT GT TRAINED 3X55' WITH RW AND MIN A INC CUES FOR LEFT SIDE SAFETY  ON RW AND POSTURE WITH DEC STEP LENGTH LEFT SIDE.

## 2023-03-23 NOTE — SUBJECTIVE & OBJECTIVE
Interval History: Patient seen and examined. He is ready to go home but blood pressure not controlled currently. Will increase lisinopril and monitor.      Review of Systems   Constitutional:  Negative for appetite change, chills, diaphoresis, fatigue and fever.   HENT:  Negative for congestion, nosebleeds, sore throat and trouble swallowing.    Eyes:  Negative for pain, discharge and visual disturbance.   Respiratory:  Negative for apnea, cough, chest tightness, shortness of breath, wheezing and stridor.    Cardiovascular:  Negative for chest pain, palpitations and leg swelling.   Gastrointestinal:  Negative for abdominal distention, abdominal pain, blood in stool, constipation, diarrhea, nausea and vomiting.   Endocrine: Negative for cold intolerance and heat intolerance.   Genitourinary:  Negative for difficulty urinating, dysuria, flank pain, frequency and urgency.   Musculoskeletal:  Negative for arthralgias, back pain, joint swelling, myalgias, neck pain and neck stiffness.   Skin:  Negative for rash and wound.   Allergic/Immunologic: Negative for food allergies and immunocompromised state.   Neurological:  Positive for facial asymmetry (left sided facial droop), speech difficulty and weakness (left sided). Negative for dizziness, seizures, syncope, light-headedness and headaches.   Hematological:  Negative for adenopathy.   Psychiatric/Behavioral:  Negative for agitation, behavioral problems and confusion. The patient is not nervous/anxious.    Objective:     Vital Signs (Most Recent):  Temp: 98.2 °F (36.8 °C) (03/23/23 1210)  Pulse: 87 (03/23/23 1330)  Resp: 18 (03/23/23 1210)  BP: (!) 188/87 (03/23/23 1210)  SpO2: 96 % (03/23/23 1210)   Vital Signs (24h Range):  Temp:  [97.6 °F (36.4 °C)-99.7 °F (37.6 °C)] 98.2 °F (36.8 °C)  Pulse:  [66-96] 87  Resp:  [18-19] 18  SpO2:  [96 %-98 %] 96 %  BP: (185-231)/() 188/87     Weight: 83 kg (183 lb)  Body mass index is 26.26 kg/m².    Intake/Output Summary (Last  24 hours) at 3/23/2023 1617  Last data filed at 3/23/2023 1413  Gross per 24 hour   Intake 420 ml   Output 500 ml   Net -80 ml      Physical Exam  Vitals and nursing note reviewed.   Constitutional:       Appearance: He is well-developed.   HENT:      Head: Normocephalic and atraumatic.      Nose: Nose normal.   Eyes:      General: No scleral icterus.  Cardiovascular:      Rate and Rhythm: Normal rate and regular rhythm.      Heart sounds: Normal heart sounds. No murmur heard.    No friction rub. No gallop.   Pulmonary:      Effort: Pulmonary effort is normal. No respiratory distress.      Breath sounds: Normal breath sounds. No wheezing.   Abdominal:      General: Bowel sounds are normal. There is no distension.      Palpations: Abdomen is soft.      Tenderness: There is no abdominal tenderness.   Musculoskeletal:         General: Normal range of motion.      Cervical back: Normal range of motion and neck supple.   Skin:     General: Skin is warm and dry.      Findings: No rash.   Neurological:      Mental Status: He is alert and oriented to person, place, and time.      Motor: Weakness (Left sided MS 4/5 to UE/BLE) present.      Comments: +left facial droop and Left arm drift    Psychiatric:         Behavior: Behavior normal.         Cognition and Memory: Impaired cognition: mild.       Significant Labs: All pertinent labs within the past 24 hours have been reviewed.  CBC:   Recent Labs   Lab 03/22/23  0628 03/23/23  0638   WBC 9.07 8.28   HGB 14.1 14.8   HCT 42.2 44.4    197     CMP:   Recent Labs   Lab 03/22/23  0628 03/23/23  0638    141   K 3.5 3.3*    111*   CO2 22* 20*    108   BUN 15 18   CREATININE 1.1 1.0   CALCIUM 9.0 8.8   PROT 7.0 7.2   ALBUMIN 3.9 3.8   BILITOT 0.7 0.8   ALKPHOS 64 67   AST 17 36   ALT 17 20   ANIONGAP 10 10       Significant Imaging: I have reviewed all pertinent imaging results/findings within the past 24 hours.

## 2023-03-23 NOTE — PROGRESS NOTES
[facility] Medicine  Progress Note    Patient Name: Surendra Lundy  MRN: [id number]  Patient Class: IP- Inpatient   Admission Date: 3/21/2023  Length of Stay: 2 days  Attending Physician: Natividad Jung DO  Primary Care Provider: Eric Adler MD        Subjective:     Principal Problem:Acute CVA (cerebrovascular accident)        HPI:  The patient is a 78yo male with hx kidney stones and low back pain who presented to [facility] ED 3/21/23 with left-sided weakness and dizziness. Pt reports on 3/20/23 at appox 3pm, he noticed dizziness and loss of balance. When he awoke this am, he noticed he could barely get OOB due to left sided weakness and loss of balance. He spoke to his son who noticed slurred speech. He then went to ED for evaluation.   He said that he has not seen a physician in over 40 years. Pt reports symptoms improved since arrival to ED.      In the ED, /113. On exam there was weakness of the left face, PERNELL and LLE. CBC and CMP unremarkable.Troponin and proBNP WNL..CTH showed small vessel disease w/o acute changes.CXR clear. EKG NSR w/o ischemic changes.     Per vascular neurology (Dr. Carmona) patient has had a small vessel occlusion. TPA not indicated. The patient was given Plavix, Aspirin and IV Labetalol with improvement in his BP to 154/90.  He was weaned off the Labetalol drip. Vascular neurology recommended permissive HTN for now.   Pt was transferred to [facility] with diagnosis CVA, hypertensive emergency for Neurology evaluation and further work up.     The patient will be placed in observation under hospital medicine      Overview/Hospital Course:  Surendra Lundy is a 77 year old male who was admitted to [facility] for suspected CVA. Awaiting MRI for confirmation. He was seen by PT/O.T. who recommends dual antiplatelet and STATIN. Hypertension gradually controlled.     03/23/23  MRI brain showed right pontine acute/subacute stroke. Neurology consulted and recommended  DAPT for 30 days, then ASA alone, along with his antihypertensive medications.  PT/OT evaluated and recommended rehab placement. Pt is currently refusing placement to inpatient facility. He prefers to have outpatient PT/OT. Blood pressure remains elevated, SBP 180s. Restarted on home lisinopril yesterday with no improvement. Will increase lisinopril to 40 mg po daily and monitor.       Interval History: Patient seen and examined. He is ready to go home but blood pressure not controlled currently. Will increase lisinopril and monitor.      Review of Systems   Constitutional:  Negative for appetite change, chills, diaphoresis, fatigue and fever.   HENT:  Negative for congestion, nosebleeds, sore throat and trouble swallowing.    Eyes:  Negative for pain, discharge and visual disturbance.   Respiratory:  Negative for apnea, cough, chest tightness, shortness of breath, wheezing and stridor.    Cardiovascular:  Negative for chest pain, palpitations and leg swelling.   Gastrointestinal:  Negative for abdominal distention, abdominal pain, blood in stool, constipation, diarrhea, nausea and vomiting.   Endocrine: Negative for cold intolerance and heat intolerance.   Genitourinary:  Negative for difficulty urinating, dysuria, flank pain, frequency and urgency.   Musculoskeletal:  Negative for arthralgias, back pain, joint swelling, myalgias, neck pain and neck stiffness.   Skin:  Negative for rash and wound.   Allergic/Immunologic: Negative for food allergies and immunocompromised state.   Neurological:  Positive for facial asymmetry (left sided facial droop), speech difficulty and weakness (left sided). Negative for dizziness, seizures, syncope, light-headedness and headaches.   Hematological:  Negative for adenopathy.   Psychiatric/Behavioral:  Negative for agitation, behavioral problems and confusion. The patient is not nervous/anxious.    Objective:     Vital Signs (Most Recent):  Temp: 98.2 °F (36.8 °C) (03/23/23  1210)  Pulse: 87 (03/23/23 1330)  Resp: 18 (03/23/23 1210)  BP: (!) 188/87 (03/23/23 1210)  SpO2: 96 % (03/23/23 1210)   Vital Signs (24h Range):  Temp:  [97.6 °F (36.4 °C)-99.7 °F (37.6 °C)] 98.2 °F (36.8 °C)  Pulse:  [66-96] 87  Resp:  [18-19] 18  SpO2:  [96 %-98 %] 96 %  BP: (185-231)/() 188/87     Weight: 83 kg (183 lb)  Body mass index is 26.26 kg/m².    Intake/Output Summary (Last 24 hours) at 3/23/2023 1617  Last data filed at 3/23/2023 1413  Gross per 24 hour   Intake 420 ml   Output 500 ml   Net -80 ml      Physical Exam  Vitals and nursing note reviewed.   Constitutional:       Appearance: He is well-developed.   HENT:      Head: Normocephalic and atraumatic.      Nose: Nose normal.   Eyes:      General: No scleral icterus.  Cardiovascular:      Rate and Rhythm: Normal rate and regular rhythm.      Heart sounds: Normal heart sounds. No murmur heard.    No friction rub. No gallop.   Pulmonary:      Effort: Pulmonary effort is normal. No respiratory distress.      Breath sounds: Normal breath sounds. No wheezing.   Abdominal:      General: Bowel sounds are normal. There is no distension.      Palpations: Abdomen is soft.      Tenderness: There is no abdominal tenderness.   Musculoskeletal:         General: Normal range of motion.      Cervical back: Normal range of motion and neck supple.   Skin:     General: Skin is warm and dry.      Findings: No rash.   Neurological:      Mental Status: He is alert and oriented to person, place, and time.      Motor: Weakness (Left sided MS 4/5 to UE/BLE) present.      Comments: +left facial droop and Left arm drift    Psychiatric:         Behavior: Behavior normal.         Cognition and Memory: Impaired cognition: mild.       Significant Labs: All pertinent labs within the past 24 hours have been reviewed.  CBC:   Recent Labs   Lab 03/22/23  0628 03/23/23  0638   WBC 9.07 8.28   HGB 14.1 14.8   HCT 42.2 44.4    197     CMP:   Recent Labs   Lab 03/22/23  0648  03/23/23  0638    141   K 3.5 3.3*    111*   CO2 22* 20*    108   BUN 15 18   CREATININE 1.1 1.0   CALCIUM 9.0 8.8   PROT 7.0 7.2   ALBUMIN 3.9 3.8   BILITOT 0.7 0.8   ALKPHOS 64 67   AST 17 36   ALT 17 20   ANIONGAP 10 10       Significant Imaging: I have reviewed all pertinent imaging results/findings within the past 24 hours.      Assessment/Plan:      * Acute CVA (cerebrovascular accident)    Antithrombotics for secondary stroke prevention: Antiplatelets: Aspirin: 81 mg daily  Clopidogrel: 75 mg daily    Statins for secondary stroke prevention and hyperlipidemia, if present:   Statins: Atorvastatin- 40 mg daily    Aggressive risk factor modification: HTN     Rehab efforts: The patient has been evaluated by a stroke team provider and the therapy needs have been fully considered based off the presenting complaints and exam findings. The following therapy evaluations are needed: PT evaluate and treat, OT evaluate and treat, SLP evaluate and treat    Diagnostics ordered/pending: Carotid ultrasound to assess vasculature, HgbA1C to assess blood glucose levels, Lipid Profile to assess cholesterol levels, MRI head without contrast to assess brain parenchyma, TTE to assess cardiac function/status , TSH to assess thyroid function    VTE prophylaxis: Enoxaparin 40 mg SQ every 24 hours    BP parameters: TIA: SBP <220 until imaging confirmation of no infarct     MRI Brain pending.   CTA to assess intracranial involvement  PT/OT recommends rehab  Control blood pressure    3/23/23  MRI brain showed right pontine acute/subacute stroke  CTA head/neck with no acute findings, no significant stenosis or occlusion  Neurology consulted - recommended DAPT therapy x 30 days then ASA alone thereafter  PT/OT recommending rehab placement but patient currently refusing inpatient facility. He would like outpatient PT/OT upon discharge        Hypertensive urgency, malignant  Patient has a current diagnosis of Hypertensive  emergency with end organ damage evidenced by acute ischemic stroke which is controlled.  Latest blood pressure and vitals reviewed-   Temp:  [97.6 °F (36.4 °C)-99.7 °F (37.6 °C)]   Pulse:  [66-96]   Resp:  [18-19]   BP: (185-231)/()   SpO2:  [96 %-98 %] .   Patient currently off IV antihypertensives.   Home meds for hypertension were reviewed and noted below.       Medication adjustment for hospital antihypertensives is as follows- permissive HTN for now   IV Hydralazine for SBP>220, DBP>110    Will aim for controlled BP reduction by medications noted above. Monitor and mitigate end organ damage as indicated.    3/22/23  Okay to start antihypertensives  Lisinopril 20 mg daily    3/23/23  SBP in the 180s  Increase Lisinopril to 40 mg daily  Monitor      VTE Risk Mitigation (From admission, onward)         Ordered     enoxaparin injection 40 mg  Daily         03/21/23 2327     IP VTE HIGH RISK PATIENT  Once         03/21/23 2327     Place sequential compression device  Until discontinued         03/21/23 2327                Discharge Planning   MAMTA:      Code Status: Full Code   Is the patient medically ready for discharge?:     Reason for patient still in hospital (select all that apply): Patient trending condition and Treatment  Discharge Plan A: Home with family                  LEXIS Rodgers  Department of Hospital Medicine   [facility] - Med Surg

## 2023-03-23 NOTE — ASSESSMENT & PLAN NOTE
Antithrombotics for secondary stroke prevention: Antiplatelets: Aspirin: 81 mg daily  Clopidogrel: 75 mg daily    Statins for secondary stroke prevention and hyperlipidemia, if present:   Statins: Atorvastatin- 40 mg daily    Aggressive risk factor modification: HTN     Rehab efforts: The patient has been evaluated by a stroke team provider and the therapy needs have been fully considered based off the presenting complaints and exam findings. The following therapy evaluations are needed: PT evaluate and treat, OT evaluate and treat, SLP evaluate and treat    Diagnostics ordered/pending: Carotid ultrasound to assess vasculature, HgbA1C to assess blood glucose levels, Lipid Profile to assess cholesterol levels, MRI head without contrast to assess brain parenchyma, TTE to assess cardiac function/status , TSH to assess thyroid function    VTE prophylaxis: Enoxaparin 40 mg SQ every 24 hours    BP parameters: TIA: SBP <220 until imaging confirmation of no infarct     MRI Brain pending.   CTA to assess intracranial involvement  PT/OT recommends rehab  Control blood pressure    3/23/23  MRI brain showed right pontine acute/subacute stroke  CTA head/neck with no acute findings, no significant stenosis or occlusion  Neurology consulted - recommended DAPT therapy x 30 days then ASA alone thereafter  PT/OT recommending rehab placement but patient currently refusing inpatient facility. He would like outpatient PT/OT upon discharge

## 2023-03-23 NOTE — PROGRESS NOTES
MRI brain reviewed and demonstrates pontine infarct on the right, which clearly explains the patient's symptoms.  This type of stroke is usually due to occlusion of perforating branch of the basilar artery.    MRI explained to the patient and emphasized need to closely monitor his blood pressure.  He will need to take ASA/Plavix for 30 days, then ASA alone, along with his antihypertensive medications.  Close follow up with his PCP also emphasized.    He will need PT/OT for next several weeks.

## 2023-03-24 LAB
ANION GAP SERPL CALC-SCNC: 14 MMOL/L (ref 8–16)
BUN SERPL-MCNC: 25 MG/DL (ref 8–23)
CALCIUM SERPL-MCNC: 9.5 MG/DL (ref 8.7–10.5)
CHLORIDE SERPL-SCNC: 111 MMOL/L (ref 95–110)
CO2 SERPL-SCNC: 19 MMOL/L (ref 23–29)
CREAT SERPL-MCNC: 1.2 MG/DL (ref 0.5–1.4)
EST. GFR  (NO RACE VARIABLE): >60 ML/MIN/1.73 M^2
GLUCOSE SERPL-MCNC: 118 MG/DL (ref 70–110)
MAGNESIUM SERPL-MCNC: 2.2 MG/DL (ref 1.6–2.6)
POTASSIUM SERPL-SCNC: 3.4 MMOL/L (ref 3.5–5.1)
SODIUM SERPL-SCNC: 144 MMOL/L (ref 136–145)

## 2023-03-24 PROCEDURE — 11000001 HC ACUTE MED/SURG PRIVATE ROOM

## 2023-03-24 PROCEDURE — 80048 BASIC METABOLIC PNL TOTAL CA: CPT | Performed by: INTERNAL MEDICINE

## 2023-03-24 PROCEDURE — 63600175 PHARM REV CODE 636 W HCPCS: Performed by: NURSE PRACTITIONER

## 2023-03-24 PROCEDURE — 25000003 PHARM REV CODE 250: Performed by: NURSE PRACTITIONER

## 2023-03-24 PROCEDURE — 36415 COLL VENOUS BLD VENIPUNCTURE: CPT | Performed by: INTERNAL MEDICINE

## 2023-03-24 PROCEDURE — 97530 THERAPEUTIC ACTIVITIES: CPT

## 2023-03-24 PROCEDURE — 97116 GAIT TRAINING THERAPY: CPT

## 2023-03-24 PROCEDURE — 83735 ASSAY OF MAGNESIUM: CPT | Performed by: INTERNAL MEDICINE

## 2023-03-24 PROCEDURE — 97130 THER IVNTJ EA ADDL 15 MIN: CPT

## 2023-03-24 PROCEDURE — 97112 NEUROMUSCULAR REEDUCATION: CPT

## 2023-03-24 PROCEDURE — 97110 THERAPEUTIC EXERCISES: CPT

## 2023-03-24 PROCEDURE — 97129 THER IVNTJ 1ST 15 MIN: CPT

## 2023-03-24 RX ORDER — AMLODIPINE BESYLATE 5 MG/1
5 TABLET ORAL NIGHTLY
Status: DISCONTINUED | OUTPATIENT
Start: 2023-03-24 | End: 2023-03-28 | Stop reason: HOSPADM

## 2023-03-24 RX ORDER — HYDRALAZINE HYDROCHLORIDE 20 MG/ML
10 INJECTION INTRAMUSCULAR; INTRAVENOUS EVERY 6 HOURS PRN
Status: DISCONTINUED | OUTPATIENT
Start: 2023-03-24 | End: 2023-03-28 | Stop reason: HOSPADM

## 2023-03-24 RX ORDER — CLONIDINE HYDROCHLORIDE 0.2 MG/1
0.2 TABLET ORAL ONCE
Status: COMPLETED | OUTPATIENT
Start: 2023-03-24 | End: 2023-03-24

## 2023-03-24 RX ORDER — POTASSIUM CHLORIDE 20 MEQ/1
20 TABLET, EXTENDED RELEASE ORAL ONCE
Status: COMPLETED | OUTPATIENT
Start: 2023-03-24 | End: 2023-03-24

## 2023-03-24 RX ORDER — HYDROCHLOROTHIAZIDE 12.5 MG/1
12.5 TABLET ORAL DAILY
Status: DISCONTINUED | OUTPATIENT
Start: 2023-03-24 | End: 2023-03-25

## 2023-03-24 RX ADMIN — MUPIROCIN: 20 OINTMENT TOPICAL at 09:03

## 2023-03-24 RX ADMIN — HYDRALAZINE HYDROCHLORIDE 10 MG: 20 INJECTION, SOLUTION INTRAMUSCULAR; INTRAVENOUS at 05:03

## 2023-03-24 RX ADMIN — CLOPIDOGREL BISULFATE 75 MG: 75 TABLET ORAL at 08:03

## 2023-03-24 RX ADMIN — POTASSIUM CHLORIDE 20 MEQ: 1500 TABLET, EXTENDED RELEASE ORAL at 03:03

## 2023-03-24 RX ADMIN — CLONIDINE HYDROCHLORIDE 0.2 MG: 0.2 TABLET ORAL at 12:03

## 2023-03-24 RX ADMIN — ASPIRIN 81 MG: 81 TABLET, COATED ORAL at 08:03

## 2023-03-24 RX ADMIN — ENOXAPARIN SODIUM 40 MG: 40 INJECTION SUBCUTANEOUS at 05:03

## 2023-03-24 RX ADMIN — HYDROCHLOROTHIAZIDE 12.5 MG: 12.5 TABLET ORAL at 06:03

## 2023-03-24 RX ADMIN — POLYETHYLENE GLYCOL 3350 17 G: 17 POWDER, FOR SOLUTION ORAL at 08:03

## 2023-03-24 RX ADMIN — ATORVASTATIN CALCIUM 40 MG: 40 TABLET, FILM COATED ORAL at 08:03

## 2023-03-24 RX ADMIN — AMLODIPINE BESYLATE 5 MG: 5 TABLET ORAL at 09:03

## 2023-03-24 RX ADMIN — LISINOPRIL 40 MG: 20 TABLET ORAL at 08:03

## 2023-03-24 RX ADMIN — SENNOSIDES AND DOCUSATE SODIUM 1 TABLET: 50; 8.6 TABLET ORAL at 08:03

## 2023-03-24 NOTE — PROGRESS NOTES
[facility] Medicine  Progress Note    Patient Name: Surendra Lundy  MRN: [id number]  Patient Class: IP- Inpatient   Admission Date: 3/21/2023  Length of Stay: 3 days  Attending Physician: Natividad Jung DO  Primary Care Provider: Eric Adler MD      Subjective:     Principal Problem:Acute CVA (cerebrovascular accident)        HPI:  The patient is a 78yo male with hx kidney stones and low back pain who presented to [facility] ED 3/21/23 with left-sided weakness and dizziness. Pt reports on 3/20/23 at appox 3pm, he noticed dizziness and loss of balance. When he awoke this am, he noticed he could barely get OOB due to left sided weakness and loss of balance. He spoke to his son who noticed slurred speech. He then went to ED for evaluation.   He said that he has not seen a physician in over 40 years. Pt reports symptoms improved since arrival to ED.      In the ED, /113. On exam there was weakness of the left face, PERNELL and LLE. CBC and CMP unremarkable.Troponin and proBNP WNL..CTH showed small vessel disease w/o acute changes.CXR clear. EKG NSR w/o ischemic changes.     Per vascular neurology (Dr. Carmona) patient has had a small vessel occlusion. TPA not indicated. The patient was given Plavix, Aspirin and IV Labetalol with improvement in his BP to 154/90.  He was weaned off the Labetalol drip. Vascular neurology recommended permissive HTN for now.   Pt was transferred to [facility] with diagnosis CVA, hypertensive emergency for Neurology evaluation and further work up.     The patient will be placed in observation under hospital medicine      Overview/Hospital Course:  Surendra Lundy is a 77 year old male who was admitted to [facility] for suspected CVA. Awaiting MRI for confirmation. He was seen by PT/O.T. who recommends dual antiplatelet and STATIN. Hypertension gradually controlled.     03/23/23  MRI brain showed right pontine acute/subacute stroke. Neurology consulted and recommended  DAPT for 30 days, then ASA alone, along with his antihypertensive medications.  PT/OT evaluated and recommended rehab placement. Pt is currently refusing placement to inpatient facility. He prefers to have outpatient PT/OT. Blood pressure remains elevated, SBP 180s. Restarted on home lisinopril yesterday with no improvement. Will increase lisinopril to 40 mg po daily and monitor.     03/24/23  Patient is agreable to rehab facility today. Antihypertensives optimized.      Interval History: ambulated in hallway with moderate assistance. Discussed with son, Edgar, so agrees patient needs rehab. Blood pressure elevated this evening. Blood pressure medications optimized.    Review of Systems   Constitutional:  Negative for appetite change, chills, diaphoresis, fatigue and fever.   HENT:  Negative for congestion, nosebleeds, sore throat and trouble swallowing.    Eyes:  Negative for pain, discharge and visual disturbance.   Respiratory:  Negative for apnea, cough, chest tightness, shortness of breath, wheezing and stridor.    Cardiovascular:  Negative for chest pain, palpitations and leg swelling.   Gastrointestinal:  Negative for abdominal distention, abdominal pain, blood in stool, constipation, diarrhea, nausea and vomiting.   Endocrine: Negative for cold intolerance and heat intolerance.   Genitourinary:  Negative for difficulty urinating, dysuria, flank pain, frequency and urgency.   Musculoskeletal:  Negative for arthralgias, back pain, joint swelling, myalgias, neck pain and neck stiffness.   Skin:  Negative for rash and wound.   Allergic/Immunologic: Negative for food allergies and immunocompromised state.   Neurological:  Positive for facial asymmetry (left sided facial droop), speech difficulty and weakness (left sided). Negative for dizziness, seizures, syncope, light-headedness and headaches.   Hematological:  Negative for adenopathy.   Psychiatric/Behavioral:  Negative for agitation, behavioral problems and  confusion. The patient is not nervous/anxious.        Objective:     Vital Signs (Most Recent):  Temp: 98.1 °F (36.7 °C) (03/24/23 1603)  Pulse: 82 (03/24/23 1603)  Resp: 16 (03/24/23 1603)  BP: (!) 198/95 (03/24/23 1603)  SpO2: 96 % (03/24/23 1603)   Vital Signs (24h Range):  Temp:  [97.8 °F (36.6 °C)-98.8 °F (37.1 °C)] 98.1 °F (36.7 °C)  Pulse:  [63-92] 82  Resp:  [16-20] 16  SpO2:  [95 %-98 %] 96 %  BP: (131-233)/() 198/95     Weight: 86.9 kg (191 lb 9.3 oz)  Body mass index is 27.49 kg/m².    Intake/Output Summary (Last 24 hours) at 3/24/2023 1800  Last data filed at 3/24/2023 1456  Gross per 24 hour   Intake 480 ml   Output --   Net 480 ml      Physical Exam  Vitals and nursing note reviewed.   Constitutional:       Appearance: He is well-developed.   HENT:      Head: Normocephalic and atraumatic.      Nose: Nose normal.   Eyes:      General: No scleral icterus.  Cardiovascular:      Rate and Rhythm: Normal rate and regular rhythm.      Heart sounds: Normal heart sounds. No murmur heard.    No friction rub. No gallop.   Pulmonary:      Effort: Pulmonary effort is normal. No respiratory distress.      Breath sounds: Normal breath sounds. No wheezing.   Abdominal:      General: Bowel sounds are normal. There is no distension.      Palpations: Abdomen is soft.      Tenderness: There is no abdominal tenderness.   Musculoskeletal:         General: Normal range of motion.      Cervical back: Normal range of motion and neck supple.   Skin:     General: Skin is warm and dry.      Findings: No rash.   Neurological:      Mental Status: He is alert and oriented to person, place, and time.      Motor: Weakness (Left sided MS 4/5 to UE/BLE) present.      Comments: +left facial droop and Left arm drift    Psychiatric:         Behavior: Behavior normal.         Cognition and Memory: Impaired cognition: mild.         Significant Labs: All pertinent labs within the past 24 hours have been reviewed.  CBC:   Recent Labs    Lab 03/23/23  0638   WBC 8.28   HGB 14.8   HCT 44.4        CMP:   Recent Labs   Lab 03/23/23  0638 03/24/23  0907    144   K 3.3* 3.4*   * 111*   CO2 20* 19*    118*   BUN 18 25*   CREATININE 1.0 1.2   CALCIUM 8.8 9.5   PROT 7.2  --    ALBUMIN 3.8  --    BILITOT 0.8  --    ALKPHOS 67  --    AST 36  --    ALT 20  --    ANIONGAP 10 14       Significant Imaging: I have reviewed all pertinent imaging results/findings within the past 24 hours.      Assessment/Plan:      * Acute CVA (cerebrovascular accident)    Antithrombotics for secondary stroke prevention: Antiplatelets: Aspirin: 81 mg daily  Clopidogrel: 75 mg daily    Statins for secondary stroke prevention and hyperlipidemia, if present:   Statins: Atorvastatin- 40 mg daily    Aggressive risk factor modification: HTN     Rehab efforts: The patient has been evaluated by a stroke team provider and the therapy needs have been fully considered based off the presenting complaints and exam findings. The following therapy evaluations are needed: PT evaluate and treat, OT evaluate and treat, SLP evaluate and treat    Diagnostics ordered/pending: Carotid ultrasound to assess vasculature, HgbA1C to assess blood glucose levels, Lipid Profile to assess cholesterol levels, MRI head without contrast to assess brain parenchyma, TTE to assess cardiac function/status , TSH to assess thyroid function    VTE prophylaxis: Enoxaparin 40 mg SQ every 24 hours    BP parameters: TIA: SBP <220 until imaging confirmation of no infarct     MRI Brain pending.   CTA to assess intracranial involvement  PT/OT recommends rehab  Control blood pressure    3/23/23  MRI brain showed right pontine acute/subacute stroke  CTA head/neck with no acute findings, no significant stenosis or occlusion  Neurology consulted - recommended DAPT therapy x 30 days then ASA alone thereafter  PT/OT recommending rehab placement but patient currently refusing inpatient facility. He would like  outpatient PT/OT upon discharge    3/24/23  Patient is agreeable to rehab facility      Hypertensive urgency, malignant  Patient has a current diagnosis of Hypertensive emergency with end organ damage evidenced by acute ischemic stroke which is controlled.  Latest blood pressure and vitals reviewed-   Temp:  [97.8 °F (36.6 °C)-98.8 °F (37.1 °C)]   Pulse:  [63-92]   Resp:  [16-20]   BP: (131-233)/()   SpO2:  [95 %-98 %] .   Patient currently off IV antihypertensives.   Home meds for hypertension were reviewed and noted below.       Medication adjustment for hospital antihypertensives is as follows- permissive HTN for now   IV Hydralazine for SBP>220, DBP>110    Will aim for controlled BP reduction by medications noted above. Monitor and mitigate end organ damage as indicated.    3/22/23  Okay to start antihypertensives  Lisinopril 20 mg daily    3/23/23  SBP in the 180s  Increase Lisinopril to 40 mg daily  Monitor    3/24/22  Better controlled this morning, but elevated throughout the day  Add amlodipine nightly and HCTZ      VTE Risk Mitigation (From admission, onward)         Ordered     enoxaparin injection 40 mg  Daily         03/21/23 2327     IP VTE HIGH RISK PATIENT  Once         03/21/23 2327     Place sequential compression device  Until discontinued         03/21/23 2327                Discharge Planning   MAMTA:      Code Status: Full Code   Is the patient medically ready for discharge?:     Reason for patient still in hospital (select all that apply): Treatment  Discharge Plan A: Rehab          Elif Schuster NP  Department of Hospital Medicine   [facility] - Med Surg

## 2023-03-24 NOTE — PT/OT/SLP PROGRESS
"Physical Therapy  Treatment    Surendra Lundy   MRN: [id number]   Admitting Diagnosis: Acute CVA (cerebrovascular accident)    PT Received On: 03/24/23  PT Start Time: 1020     PT Stop Time: 1043    PT Total Time (min): 23 min       Billable Minutes:  Gait Training 13 and Neuromuscular Re-education 10    Treatment Type: Treatment  PT/PTA: PT     Number of PTA visits since last PT visit: 0       General Precautions: Standard, fall  Orthopedic Precautions: N/A  Braces: N/A  Respiratory Status: Room air         Subjective:  Communicated with NURSE ISAIAH AND Epic CHART REVIEW  prior to session.   PT AGREED TO TX     Pain/Comfort  Pain Rating 1: 0/10  Pain Rating Post-Intervention 1: 0/10    Objective:   Patient found with: peripheral IV, telemetry    Functional Mobility:  PT MET IN  SEATED IN CHAIR. PT REPORTED, "I AM WEAKER TODAY. I CANT MOVE MY ARM AS WELL." PT WITH DEC AROM OF L UE AND L LE NOTED TO HAVE INC WEAKNESS WITH GROSS FUNC MOBILITY. GT. BELT AND  SOCKS DONNED PRIOR TO OOB MOBILITY. PT STOOD WITH RW AND CUES FOR PUSH OFF WITH L UE WITH MIN A. PT GT TRAINED 2X40' WITH MOD>MIN A WITH INC CUES FOR L LE CLEARANCE WITH SWING AND INC STRIDE LENGTH. PT RETURNED TO  T/F TO CHAIR FOR REST. PT COMPLETED 2X5 TRIALS OF SIT>STAND WITH R LE WITH INC EXTENSION TO FACILITATE FORCED USE OF L LE PROGRESSING TO NO R UE PUSH OFF TO INC FORCE USE OF L UE. PT ALSO EDUCATED ON REC FOR INPT REHAB AS PT HAS LIMITED INSIGHT INTO HIS DEFICITS AND DEC SAFETY AWARENESS. PT LEFT SEATED IN CHAIR WITH ALL NEEDS MET.     Treatment and Education:  PT EDUCATED ON RISK FOR FALLS DUE TO LEFT SIDED WEAKNESS, EDUCATED ON "CALL DON'T FALL", ENCOURAGED TO CALL FOR ASSISTANCE WITH ALL NEEDS SUCH AS BED<>CHAIR TRANSFERS OR TRIPS TO BATHROOM         AM-PAC 6 CLICK MOBILITY  How much help from another person does this patient currently need?   1 = Unable, Total/Dependent Assistance  2 = A lot, Maximum/Moderate Assistance  3 = A little, " Minimum/Contact Guard/Supervision  4 = None, Modified [place]/Independent    Turning over in bed (including adjusting bedclothes, sheets and blankets)?: 1 (NT)  Sitting down on and standing up from a chair with arms (e.g., wheelchair, bedside commode, etc.): 3  Moving from lying on back to sitting on the side of the bed?: 1 (NT)  Moving to and from a bed to a chair (including a wheelchair)?: 3  Need to walk in hospital room?: 2  Climbing 3-5 steps with a railing?: 1  Basic Mobility Total Score: 11    AM-PAC Raw Score CMS G-Code Modifier Level of Impairment Assistance   6 % Total / Unable   7 - 9 CM 80 - 100% Maximal Assist   10 - 14 CL 60 - 80% Moderate Assist   15 - 19 CK 40 - 60% Moderate Assist   20 - 22 CJ 20 - 40% Minimal Assist   23 CI 1-20% SBA / CGA   24 CH 0% Independent/ Mod I     Patient left up in chair with call button in reach and chair alarm on.    Assessment:  PT WITH INC L LE/ UE WEAKNESS NOTED AND INC CUES AND ASSIST FOR GROSS FUNC MOBILITY NOTED TODAY. PT WILL BENEFIT FROM CONT THERAPY @ INPT REHAB @ D/C    Rehab identified problem list/impairments: weakness, impaired endurance, impaired self care skills, impaired functional mobility, decreased coordination, impaired cognition, impaired balance, gait instability, decreased upper extremity function, decreased lower extremity function, decreased ROM, impaired coordination, decreased safety awareness, abnormal tone    Rehab potential is good.    Activity tolerance: Fair    Discharge recommendations: rehabilitation facility      Barriers to discharge:      Equipment recommendations: to be determined by next level of care     GOALS:   Multidisciplinary Problems       Physical Therapy Goals          Problem: Physical Therapy    Goal Priority Disciplines Outcome Goal Variances Interventions   Physical Therapy Goal     PT, PT/OT Ongoing, Progressing     Description: LT23  1. PT WILL COMPLETE BED MOBILITY IND  2. PT WILL GT TRAIN X 150'  WITH RW AND SBA WITH INC L LE SWING   3. PT WILL T/F TO CHAIR WITH RW AND SBA                       PLAN:    Patient to be seen 3 x/week to address the above listed problems via gait training, therapeutic activities, therapeutic exercises, neuromuscular re-education  Plan of Care expires: 04/07/23  Plan of Care reviewed with: patient         03/24/2023

## 2023-03-24 NOTE — ASSESSMENT & PLAN NOTE
Patient has a current diagnosis of Hypertensive emergency with end organ damage evidenced by acute ischemic stroke which is controlled.  Latest blood pressure and vitals reviewed-   Temp:  [97.8 °F (36.6 °C)-98.8 °F (37.1 °C)]   Pulse:  [63-92]   Resp:  [16-20]   BP: (131-233)/()   SpO2:  [95 %-98 %] .   Patient currently off IV antihypertensives.   Home meds for hypertension were reviewed and noted below.       Medication adjustment for hospital antihypertensives is as follows- permissive HTN for now   IV Hydralazine for SBP>220, DBP>110    Will aim for controlled BP reduction by medications noted above. Monitor and mitigate end organ damage as indicated.    3/22/23  Okay to start antihypertensives  Lisinopril 20 mg daily    3/23/23  SBP in the 180s  Increase Lisinopril to 40 mg daily  Monitor    3/24/22  Better controlled this morning, but elevated throughout the day  Add amlodipine nightly and HCTZ

## 2023-03-24 NOTE — PLAN OF CARE
03/24/23 1115   Post-Acute Status   Post-Acute Authorization Placement   Post-Acute Placement Status Referrals Sent   Coverage [facility]   Discharge Plan   Discharge Plan A Rehab     Pt and son Edgar [phone] are both in agreement with rehab. Pending accepting facilities.  2:01pm BARBIE spoke with pt's son who [place] [facility] BARBIE notified Linda at [facility] who will submit for auth.

## 2023-03-24 NOTE — ASSESSMENT & PLAN NOTE
Antithrombotics for secondary stroke prevention: Antiplatelets: Aspirin: 81 mg daily  Clopidogrel: 75 mg daily    Statins for secondary stroke prevention and hyperlipidemia, if present:   Statins: Atorvastatin- 40 mg daily    Aggressive risk factor modification: HTN     Rehab efforts: The patient has been evaluated by a stroke team provider and the therapy needs have been fully considered based off the presenting complaints and exam findings. The following therapy evaluations are needed: PT evaluate and treat, OT evaluate and treat, SLP evaluate and treat    Diagnostics ordered/pending: Carotid ultrasound to assess vasculature, HgbA1C to assess blood glucose levels, Lipid Profile to assess cholesterol levels, MRI head without contrast to assess brain parenchyma, TTE to assess cardiac function/status , TSH to assess thyroid function    VTE prophylaxis: Enoxaparin 40 mg SQ every 24 hours    BP parameters: TIA: SBP <220 until imaging confirmation of no infarct     MRI Brain pending.   CTA to assess intracranial involvement  PT/OT recommends rehab  Control blood pressure    3/23/23  MRI brain showed right pontine acute/subacute stroke  CTA head/neck with no acute findings, no significant stenosis or occlusion  Neurology consulted - recommended DAPT therapy x 30 days then ASA alone thereafter  PT/OT recommending rehab placement but patient currently refusing inpatient facility. He would like outpatient PT/OT upon discharge    3/24/23  Patient is agreeable to rehab facility

## 2023-03-24 NOTE — PT/OT/SLP PROGRESS
Occupational Therapy  Treatment    Surendra Lundy   MRN: [id number]   Admitting Diagnosis: Acute CVA (cerebrovascular accident)    OT Date of Treatment: 03/24/23   OT Start Time: 1000  OT Stop Time: 1025  OT Total Time (min): 25 min    Billable Minutes:  Therapeutic Activity 15 and Therapeutic Exercise 10    OT/MAYRA: OT          General Precautions: Standard, aspiration  Orthopedic Precautions: N/A  Braces: N/A  Respiratory Status: Room air         Subjective:  Communicated with nurse and epic chart review prior to session.  Pain/Comfort  Pain Rating 1: 0/10    Objective:        Functional Mobility:      Transfers:  Min a x 2 with sit /stand and vc's to tighten quads on left le    Functional Ambulation: pt ambulated 40 feet x 2 with max vc's for sequencing and posture. Pt educated on correct technique stepping activity.    Balance:   Static Sit: FAIR+: Able to take MINIMAL challenges from all directions  Dynamic Sit: FAIR+: Maintains balance through MINIMAL excursions of active trunk motion  Static Stand: POOR+: Needs MINIMAL assist to maintain  Dynamic stand: POOR+: Needs MIN (minimal ) assist during gait    Therapeutic Activities and Exercises:  Patient educated on role of OT in acute setting and benefits of participation. Educated on techniques to use to increase independence and decrease fall risk with functional transfers. Pt performed 2 sets x 5 reps sit to stand tranfers. Educated on importance of OOB activity and calling for A to transfer back to bed.  Pt performed dynamic standing activity reaching across midline in various planes and ranges while facilitating contraction in L le to prevent knee buckling and stabilization of left leg.  Pt educated on HEP. Pt performed 1 set x 15 reps R AROM / L A/AAROM  UE exercise.  Encouraged completion of B UE AROM therex throughout the day to tolerance to increase functional strength and activity tolerance. Patient stated understanding and in agreement with POC.      AM-PAC  "6 CLICK ADL   How much help from another person does this patient currently need?   1 = Unable, Total/Dependent Assistance  2 = A lot, Maximum/Moderate Assistance  3 = A little, Minimum/Contact Guard/Supervision  4 = None, Modified [place]/Independent    Putting on and taking off regular lower body clothing? : 2  Bathing (including washing, rinsing, drying)?: 2  Toileting, which includes using toilet, bedpan, or urinal? : 2  Putting on and taking off regular upper body clothing?: 3  Taking care of personal grooming such as brushing teeth?: 3  Eating meals?: 3  Daily Activity Total Score: 15     AM-PAC Raw Score CMS "G-Code Modifier Level of Impairment Assistance   6 % Total / Unable   7 - 8 CM 80 - 100% Maximal Assist   9-13 CL 60 - 80% Moderate Assist   14 - 19 CK 40 - 60% Moderate Assist   20 - 22 CJ 20 - 40% Minimal Assist   23 CI 1-20% SBA / CGA   24 CH 0% Independent/ Mod I       Patient left up in chair with all lines intact, call button in reach, bed alarm on, and nurse notified    ASSESSMENT:  Surendra Lundy is a 77 y.o. male with a medical diagnosis of Acute CVA (cerebrovascular accident) and presents with debility and l side weakness.    Rehab identified problem list/impairments:  weakness, impaired endurance, impaired balance, decreased safety awareness, impaired skin, impaired self care skills, impaired functional mobility, decreased upper extremity function, decreased lower extremity function    Rehab potential is good.    Activity tolerance: Good    Discharge recommendations: rehabilitation facility   Barriers to discharge:      Equipment recommendations: to be determined by next level of care    GOALS:   Multidisciplinary Problems       Occupational Therapy Goals          Problem: Occupational Therapy    Goal Priority Disciplines Outcome Interventions   Occupational Therapy Goal     OT, PT/OT Ongoing, Progressing    Description: O.T. GOALS TO BE MET BY 4-5-23  PT WILL TOLERATE 1 SET X 15 " REPS B UE ROM EXERCISE TO INCREASE B UE STRENGTH/ENDURANCE AND L UE ROM  MIN A WITH LE DRESSING  SBA WITH TOILET TRANSFERS                         Plan:  Patient to be seen 2 x/week to address the above listed problems via self-care/home management, therapeutic activities, therapeutic exercises  Plan of Care expires: 03/22/23  Plan of Care reviewed with: patient         03/24/2023

## 2023-03-24 NOTE — PLAN OF CARE
Problem: Adult Inpatient Plan of Care  Goal: Plan of Care Review  Outcome: Ongoing, Progressing     Problem: Fall Injury Risk  Goal: Absence of Fall and Fall-Related Injury  Outcome: Ongoing, Progressing     Problem: Infection  Goal: Absence of Infection Signs and Symptoms  Outcome: Ongoing, Progressing     Discussed POC with pt, verbalized understanding.  Patient remains free from injury. Safety , aspiration and seizure precautions maintained. No s/s of acute distress.  Purposeful rounding every two hours.   Cardiac monitoring in place, tele box number [id number]  Diet orders continued, pt diet: cardiac  Neuro checks q4 and left side passive range of motion continued this shift.  Chart and orders review completed. Pt education about care completed.

## 2023-03-24 NOTE — PLAN OF CARE
Pt ambulated to bathroom x 2. /98 @ 16:00 VS check  IV Hydralazine administered PRN. Pt resting comfortably. Orders acknowledged. Family at bedside. Continue current plan of care.

## 2023-03-24 NOTE — SUBJECTIVE & OBJECTIVE
Interval History: ambulated in hallway with moderate assistance. Discussed with son, Edgar, so agrees patient needs rehab. Blood pressure elevated this evening. Blood pressure medications optimized.    Review of Systems   Constitutional:  Negative for appetite change, chills, diaphoresis, fatigue and fever.   HENT:  Negative for congestion, nosebleeds, sore throat and trouble swallowing.    Eyes:  Negative for pain, discharge and visual disturbance.   Respiratory:  Negative for apnea, cough, chest tightness, shortness of breath, wheezing and stridor.    Cardiovascular:  Negative for chest pain, palpitations and leg swelling.   Gastrointestinal:  Negative for abdominal distention, abdominal pain, blood in stool, constipation, diarrhea, nausea and vomiting.   Endocrine: Negative for cold intolerance and heat intolerance.   Genitourinary:  Negative for difficulty urinating, dysuria, flank pain, frequency and urgency.   Musculoskeletal:  Negative for arthralgias, back pain, joint swelling, myalgias, neck pain and neck stiffness.   Skin:  Negative for rash and wound.   Allergic/Immunologic: Negative for food allergies and immunocompromised state.   Neurological:  Positive for facial asymmetry (left sided facial droop), speech difficulty and weakness (left sided). Negative for dizziness, seizures, syncope, light-headedness and headaches.   Hematological:  Negative for adenopathy.   Psychiatric/Behavioral:  Negative for agitation, behavioral problems and confusion. The patient is not nervous/anxious.        Objective:     Vital Signs (Most Recent):  Temp: 98.1 °F (36.7 °C) (03/24/23 1603)  Pulse: 82 (03/24/23 1603)  Resp: 16 (03/24/23 1603)  BP: (!) 198/95 (03/24/23 1603)  SpO2: 96 % (03/24/23 1603)   Vital Signs (24h Range):  Temp:  [97.8 °F (36.6 °C)-98.8 °F (37.1 °C)] 98.1 °F (36.7 °C)  Pulse:  [63-92] 82  Resp:  [16-20] 16  SpO2:  [95 %-98 %] 96 %  BP: (131-233)/() 198/95     Weight: 86.9 kg (191 lb 9.3 oz)  Body  mass index is 27.49 kg/m².    Intake/Output Summary (Last 24 hours) at 3/24/2023 1800  Last data filed at 3/24/2023 1456  Gross per 24 hour   Intake 480 ml   Output --   Net 480 ml      Physical Exam  Vitals and nursing note reviewed.   Constitutional:       Appearance: He is well-developed.   HENT:      Head: Normocephalic and atraumatic.      Nose: Nose normal.   Eyes:      General: No scleral icterus.  Cardiovascular:      Rate and Rhythm: Normal rate and regular rhythm.      Heart sounds: Normal heart sounds. No murmur heard.    No friction rub. No gallop.   Pulmonary:      Effort: Pulmonary effort is normal. No respiratory distress.      Breath sounds: Normal breath sounds. No wheezing.   Abdominal:      General: Bowel sounds are normal. There is no distension.      Palpations: Abdomen is soft.      Tenderness: There is no abdominal tenderness.   Musculoskeletal:         General: Normal range of motion.      Cervical back: Normal range of motion and neck supple.   Skin:     General: Skin is warm and dry.      Findings: No rash.   Neurological:      Mental Status: He is alert and oriented to person, place, and time.      Motor: Weakness (Left sided MS 4/5 to UE/BLE) present.      Comments: +left facial droop and Left arm drift    Psychiatric:         Behavior: Behavior normal.         Cognition and Memory: Impaired cognition: mild.         Significant Labs: All pertinent labs within the past 24 hours have been reviewed.  CBC:   Recent Labs   Lab 03/23/23  0638   WBC 8.28   HGB 14.8   HCT 44.4        CMP:   Recent Labs   Lab 03/23/23  0638 03/24/23  0907    144   K 3.3* 3.4*   * 111*   CO2 20* 19*    118*   BUN 18 25*   CREATININE 1.0 1.2   CALCIUM 8.8 9.5   PROT 7.2  --    ALBUMIN 3.8  --    BILITOT 0.8  --    ALKPHOS 67  --    AST 36  --    ALT 20  --    ANIONGAP 10 14       Significant Imaging: I have reviewed all pertinent imaging results/findings within the past 24 hours.

## 2023-03-24 NOTE — PLAN OF CARE
PT GT TRAINED 2X40' WITH RW AND MOD>MIN A WITH INC CUES FOR L LE TO INC STRIKE AND L UE  ON RW. PT WITH INC WEAKNESS OF L UE/ LE NOTED TODAY.

## 2023-03-24 NOTE — PT/OT/SLP PROGRESS
Speech Language Pathology Treatment    Patient Name:  Surendra Lundy   MRN:  [id number]  Admitting Diagnosis: Acute CVA (cerebrovascular accident)    Recommendations:                 General Recommendations:  Speech/language therapy and Cognitive-linguistic therapy  Diet recommendations:  Regular Diet - IDDSI Level 7, Liquid Diet Level: Thin liquids - IDDSI Level 0   Aspiration Precautions: Avoid talking while eating, Check for pocketing/oral residue, Frequent oral care, HOB to 90 degrees, Remain upright 30 minutes post meal, Small bites/sips, and Standard aspiration precautions   General Precautions: Standard, aspiration  Communication strategies:   careful listening s/t dysarthria    Subjective     Patient was seen at bedside for ST. Patient reported he had improved strength in his left side. Patient was oriented to person, place, time.  Patient goals:  Go home    Pain/Comfort:  Pain Rating 1: 0/10  Pain Rating Post-Intervention 1: 0/10  Pain Rating 2: 0/10  Pain Rating Post-Intervention 2: 0/10    Respiratory Status: Room air    Objective:     Has the patient been evaluated by SLP for swallowing?   Yes  Keep patient NPO? No   Current Respiratory Status:          GOALS PROGRESS   Pt will consume IDDSI 7 (regular solids) with IDDSI 0 (thin liquids) without incident. Patient reported no difficulty with recommended diet.    Pt will improve cognitive-communicative skills to baseline level of function/independence related to speech intelligibility, memory recall and executive functioning. Patient was assessed using the CLQT. See results below.      Cognitive Linguistic Quick Test (CLQT) was administered to quickly assess the patient's overall cognitive-linguistic function and to determine cognitive strengths and weaknesses.           Cognitive Domain Score     Severity Rating      Attention    128 / 215 mild  Memory    156 / 185 WFL  Executive Functions   13 / 40  mild  Language    28 / 37  WFL  Visuospatial Skills    43  "/ 105 mild  Clock Drawing    9 / 13      Composite Severity Rating  3.4 / 4.0 mild    Task Score Ages 18-69 Cut Score Ages 70-89 Cut Score Below?   Personal Facts  8  8 8 average   Symbol Cancellation 1  11 10 below average   Confrontational naming  10  10 10 average   Clock drawing  9  12 11 below average   Story Retelling 6  6 5 average   Symbol Trails 4  9 6 below average   Generative Naming 4  5 4 average   Design Memory 6  5 4 average   Mazes 2  7 4 below average   Design Generation  3  6 5 below average        Assessment:     Surendra Lundy is a 77 y.o. male with an SLP diagnosis of (left) OM weakness/asymmetry, dysarthria of speech and mild cognitive impairment s/p confirmed CVA on MRI. Patient was assessed using the CLQT+. 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 Of note patient often requested clarification of tasks in which the clinician responded "do your best" after repeating the directions once as indicated by the assessment protocol. Patient would benefit from continued ST post-acute/rehab for cognitive-communicative impairment.     Goals:   Multidisciplinary Problems       SLP Goals          Problem: SLP    Goal Priority Disciplines Outcome   SLP Goal     SLP Ongoing, Progressing   Description: 1.  Pt will consume IDDSI 7 (regular solids) with IDDSI 0 (thin liquids) without incident.    2.  Pt will improve cognitive-communicative skills to baseline level of function/independence related to speech intelligibility, memory recall and executive functioning.                       Plan:     Patient to be seen:  2 x/week, 3 x/week   Plan of Care expires:  03/29/23  Plan of Care reviewed with:  patient   SLP Follow-Up:  Yes       Discharge recommendations: rehabilitation facility  Barriers to Discharge:  None    Time Tracking:     SLP Treatment Date:   " 03/24/23  Speech Start Time:  1436  Speech Stop Time:  1515     Speech Total Time (min):  39 min    Billable Minutes: Speech Therapy Individual 39 min    03/24/2023

## 2023-03-25 PROCEDURE — 25000003 PHARM REV CODE 250: Performed by: NURSE PRACTITIONER

## 2023-03-25 PROCEDURE — 63600175 PHARM REV CODE 636 W HCPCS: Performed by: NURSE PRACTITIONER

## 2023-03-25 PROCEDURE — 11000001 HC ACUTE MED/SURG PRIVATE ROOM

## 2023-03-25 PROCEDURE — 25000003 PHARM REV CODE 250: Performed by: INTERNAL MEDICINE

## 2023-03-25 PROCEDURE — 97116 GAIT TRAINING THERAPY: CPT | Mod: CQ

## 2023-03-25 RX ORDER — HYDROCHLOROTHIAZIDE 25 MG/1
25 TABLET ORAL DAILY
Status: DISCONTINUED | OUTPATIENT
Start: 2023-03-25 | End: 2023-03-28 | Stop reason: HOSPADM

## 2023-03-25 RX ADMIN — ACETAMINOPHEN 650 MG: 325 TABLET ORAL at 09:03

## 2023-03-25 RX ADMIN — MUPIROCIN: 20 OINTMENT TOPICAL at 09:03

## 2023-03-25 RX ADMIN — CLOPIDOGREL BISULFATE 75 MG: 75 TABLET ORAL at 09:03

## 2023-03-25 RX ADMIN — AMLODIPINE BESYLATE 5 MG: 5 TABLET ORAL at 09:03

## 2023-03-25 RX ADMIN — HYDROCHLOROTHIAZIDE 25 MG: 25 TABLET ORAL at 04:03

## 2023-03-25 RX ADMIN — ASPIRIN 81 MG: 81 TABLET, COATED ORAL at 09:03

## 2023-03-25 RX ADMIN — LISINOPRIL 40 MG: 20 TABLET ORAL at 09:03

## 2023-03-25 RX ADMIN — ENOXAPARIN SODIUM 40 MG: 40 INJECTION SUBCUTANEOUS at 05:03

## 2023-03-25 RX ADMIN — HYDRALAZINE HYDROCHLORIDE 10 MG: 20 INJECTION, SOLUTION INTRAMUSCULAR; INTRAVENOUS at 01:03

## 2023-03-25 RX ADMIN — ATORVASTATIN CALCIUM 40 MG: 40 TABLET, FILM COATED ORAL at 09:03

## 2023-03-25 NOTE — PT/OT/SLP PROGRESS
Physical Therapy  Treatment    Surendra Lundy   MRN: [id number]   Admitting Diagnosis: Acute CVA (cerebrovascular accident)       PT Start Time: 1140     PT Stop Time: 1155    PT Total Time (min): 15 min       Billable Minutes:  Gait Training 15    Treatment Type: Treatment  PT/PTA: PTA     Number of PTA visits since last PT visit: 1       General Precautions: Standard, fall  Orthopedic Precautions: N/A  Braces: N/A       Subjective:  Communicated with DAYNEL prior to session.      Pain/Comfort  Pain Rating 1: 0/10  Pain Rating Post-Intervention 1: 0/10    Treatment and Education:    PT SITTING IN BS CHAIR UPON ARRIVAL WITH MULTIPLE FAMILY MEMBERS IN ROOM     SIT<-->STAND WITH SBA WITH FAST MOVEMENT     AMBULATED WITH HHA ON HIS R SIDE 2 X 60' WITH CONVERSATION       AM-PAC 6 CLICK MOBILITY  How much help from another person does this patient currently need?   1 = Unable, Total/Dependent Assistance  2 = A lot, Maximum/Moderate Assistance  3 = A little, Minimum/Contact Guard/Supervision  4 = None, Modified [place]/Independent    Turning over in bed (including adjusting bedclothes, sheets and blankets)?:  (NA)  Sitting down on and standing up from a chair with arms (e.g., wheelchair, bedside commode, etc.): 4  Moving from lying on back to sitting on the side of the bed?:  (A)  Moving to and from a bed to a chair (including a wheelchair)?:  (NA)  Need to walk in hospital room?: 3  Climbing 3-5 steps with a railing?:  (NA)    AM-PAC Raw Score CMS G-Code Modifier Level of Impairment Assistance   6 % Total / Unable   7 - 9 CM 80 - 100% Maximal Assist   10 - 14 CL 60 - 80% Moderate Assist   15 - 19 CK 40 - 60% Moderate Assist   20 - 22 CJ 20 - 40% Minimal Assist   23 CI 1-20% SBA / CGA   24 CH 0% Independent/ Mod I     Patient left up in chair with all lines intact, call button in reach, chair alarm on, and MULTIPLE FAMILY MEMBERS present.    Assessment:  Surendra Lundy is a 77 y.o. male with a medical diagnosis  of Acute CVA (cerebrovascular accident) and presents with .    Rehab identified problem list/impairments: weakness, decreased safety awareness, impaired self care skills, impaired functional mobility, decreased upper extremity function, decreased lower extremity function    Rehab potential is good.    Activity tolerance: Good    Discharge recommendations: rehabilitation facility      Barriers to discharge:      Equipment recommendations: none     GOALS:   Multidisciplinary Problems       Physical Therapy Goals          Problem: Physical Therapy    Goal Priority Disciplines Outcome Goal Variances Interventions   Physical Therapy Goal     PT, PT/OT Ongoing, Progressing     Description: LT23  1. PT WILL COMPLETE BED MOBILITY IND  2. PT WILL GT TRAIN X 150' WITH RW AND SBA WITH INC L LE SWING   3. PT WILL T/F TO CHAIR WITH RW AND SBA                       PLAN:    Patient to be seen 3 x/week to address the above listed problems via gait training, therapeutic activities, therapeutic exercises  Plan of Care expires: 23  Plan of Care reviewed with: patient         2023

## 2023-03-25 NOTE — PLAN OF CARE
Discussed poc with pt, pt verbalized understanding    Purposeful rounding every 2hours    VS wnl  Cardiac monitoring in use, pt is NSR, tele monitor # [id number]  Fall precautions in place, remains injury free  Pain under control with PRN meds    Accurate I&Os  Bed locked at lowest position  Call light within reach    Chart check complete  Will cont with POC

## 2023-03-25 NOTE — PROGRESS NOTES
[facility] Medicine  Progress Note    Patient Name: Surendra Lundy  MRN: [id number]  Patient Class: IP- Inpatient   Admission Date: 3/21/2023  Length of Stay: 4 days  Attending Physician: Natividad Jung DO  Primary Care Provider: Eric Adler MD      Subjective:     Principal Problem:Acute CVA (cerebrovascular accident)        HPI:  The patient is a 76yo male with hx kidney stones and low back pain who presented to [facility] ED 3/21/23 with left-sided weakness and dizziness. Pt reports on 3/20/23 at appox 3pm, he noticed dizziness and loss of balance. When he awoke this am, he noticed he could barely get OOB due to left sided weakness and loss of balance. He spoke to his son who noticed slurred speech. He then went to ED for evaluation.   He said that he has not seen a physician in over 40 years. Pt reports symptoms improved since arrival to ED.      In the ED, /113. On exam there was weakness of the left face, PERNELL and LLE. CBC and CMP unremarkable.Troponin and proBNP WNL..CTH showed small vessel disease w/o acute changes.CXR clear. EKG NSR w/o ischemic changes.     Per vascular neurology (Dr. Carmona) patient has had a small vessel occlusion. TPA not indicated. The patient was given Plavix, Aspirin and IV Labetalol with improvement in his BP to 154/90.  He was weaned off the Labetalol drip. Vascular neurology recommended permissive HTN for now.   Pt was transferred to [facility] with diagnosis CVA, hypertensive emergency for Neurology evaluation and further work up.     The patient will be placed in observation under hospital medicine      Overview/Hospital Course:  Surendra Lundy is a 77 year old male who was admitted to [facility] for suspected CVA. Awaiting MRI for confirmation. He was seen by PT/O.T. who recommends dual antiplatelet and STATIN. Hypertension gradually controlled.     03/23/23  MRI brain showed right pontine acute/subacute stroke. Neurology consulted and recommended  DAPT for 30 days, then ASA alone, along with his antihypertensive medications.  PT/OT evaluated and recommended rehab placement. Pt is currently refusing placement to inpatient facility. He prefers to have outpatient PT/OT. Blood pressure remains elevated, SBP 180s. Restarted on home lisinopril yesterday with no improvement. Will increase lisinopril to 40 mg po daily and monitor.     03/24/23  Patient is agreable to rehab facility today. Antihypertensives optimized.    3/25/23  Hypertensive improved with medical management. Awaiting placement        Interval History: doing well. Awaiting placement. Participating in therapy    Review of Systems   Constitutional:  Negative for appetite change, chills, diaphoresis, fatigue and fever.   HENT:  Negative for congestion, nosebleeds, sore throat and trouble swallowing.    Eyes:  Negative for pain, discharge and visual disturbance.   Respiratory:  Negative for apnea, cough, chest tightness, shortness of breath, wheezing and stridor.    Cardiovascular:  Negative for chest pain, palpitations and leg swelling.   Gastrointestinal:  Negative for abdominal distention, abdominal pain, blood in stool, constipation, diarrhea, nausea and vomiting.   Endocrine: Negative for cold intolerance and heat intolerance.   Genitourinary:  Negative for difficulty urinating, dysuria, flank pain, frequency and urgency.   Musculoskeletal:  Negative for arthralgias, back pain, joint swelling, myalgias, neck pain and neck stiffness.   Skin:  Negative for rash and wound.   Allergic/Immunologic: Negative for food allergies and immunocompromised state.   Neurological:  Positive for facial asymmetry (left sided facial droop), speech difficulty and weakness (left sided). Negative for dizziness, seizures, syncope, light-headedness and headaches.   Hematological:  Negative for adenopathy.   Psychiatric/Behavioral:  Negative for agitation, behavioral problems and confusion. The patient is not  nervous/anxious.        Objective:     Vital Signs (Most Recent):  Temp: 98.4 °F (36.9 °C) (03/25/23 1542)  Pulse: 80 (03/25/23 1542)  Resp: 18 (03/25/23 1542)  BP: (!) 169/80 (03/25/23 1542)  SpO2: 95 % (03/25/23 1542)   Vital Signs (24h Range):  Temp:  [98.4 °F (36.9 °C)-98.9 °F (37.2 °C)] 98.4 °F (36.9 °C)  Pulse:  [71-95] 80  Resp:  [18] 18  SpO2:  [94 %-99 %] 95 %  BP: (135-186)/(68-89) 169/80     Weight: 86.6 kg (190 lb 14.7 oz)  Body mass index is 27.39 kg/m².    Intake/Output Summary (Last 24 hours) at 3/25/2023 1724  Last data filed at 3/25/2023 1200  Gross per 24 hour   Intake 1020 ml   Output 825 ml   Net 195 ml      Physical Exam  Vitals and nursing note reviewed.   Constitutional:       Appearance: He is well-developed.   HENT:      Head: Normocephalic and atraumatic.      Nose: Nose normal.   Eyes:      General: No scleral icterus.  Cardiovascular:      Rate and Rhythm: Normal rate and regular rhythm.      Heart sounds: Normal heart sounds. No murmur heard.    No friction rub. No gallop.   Pulmonary:      Effort: Pulmonary effort is normal. No respiratory distress.      Breath sounds: Normal breath sounds. No wheezing.   Abdominal:      General: Bowel sounds are normal. There is no distension.      Palpations: Abdomen is soft.      Tenderness: There is no abdominal tenderness.   Musculoskeletal:         General: Normal range of motion.      Cervical back: Normal range of motion and neck supple.   Skin:     General: Skin is warm and dry.      Findings: No rash.   Neurological:      Mental Status: He is alert and oriented to person, place, and time.      Motor: Weakness (Left sided MS 4/5 to UE/BLE) present.      Comments: +left facial droop and Left arm drift    Psychiatric:         Behavior: Behavior normal.         Cognition and Memory: Impaired cognition: mild.       Significant Labs: All pertinent labs within the past 24 hours have been reviewed.    CMP:   Recent Labs   Lab 03/24/23  0907       K 3.4*   *   CO2 19*   *   BUN 25*   CREATININE 1.2   CALCIUM 9.5   ANIONGAP 14       Significant Imaging: I have reviewed all pertinent imaging results/findings within the past 24 hours.      Assessment/Plan:      * Acute CVA (cerebrovascular accident)    Antithrombotics for secondary stroke prevention: Antiplatelets: Aspirin: 81 mg daily  Clopidogrel: 75 mg daily    Statins for secondary stroke prevention and hyperlipidemia, if present:   Statins: Atorvastatin- 40 mg daily    Aggressive risk factor modification: HTN     Rehab efforts: The patient has been evaluated by a stroke team provider and the therapy needs have been fully considered based off the presenting complaints and exam findings. The following therapy evaluations are needed: PT evaluate and treat, OT evaluate and treat, SLP evaluate and treat    Diagnostics ordered/pending: Carotid ultrasound to assess vasculature, HgbA1C to assess blood glucose levels, Lipid Profile to assess cholesterol levels, MRI head without contrast to assess brain parenchyma, TTE to assess cardiac function/status , TSH to assess thyroid function    VTE prophylaxis: Enoxaparin 40 mg SQ every 24 hours    BP parameters: TIA: SBP <220 until imaging confirmation of no infarct     MRI Brain pending.   CTA to assess intracranial involvement  PT/OT recommends rehab  Control blood pressure    3/23/23  MRI brain showed right pontine acute/subacute stroke  CTA head/neck with no acute findings, no significant stenosis or occlusion  Neurology consulted - recommended DAPT therapy x 30 days then ASA alone thereafter  PT/OT recommending rehab placement but patient currently refusing inpatient facility. He would like outpatient PT/OT upon discharge    3/24/23  Patient is agreeable to rehab facility      Hypertensive urgency, malignant  Patient has a current diagnosis of Hypertensive emergency with end organ damage evidenced by acute ischemic stroke which is controlled.  Latest  blood pressure and vitals reviewed-   Temp:  [98.4 °F (36.9 °C)-98.9 °F (37.2 °C)]   Pulse:  [71-95]   Resp:  [18]   BP: (135-186)/(68-89)   SpO2:  [94 %-99 %] .   Patient currently off IV antihypertensives.   Home meds for hypertension were reviewed and noted below.       Medication adjustment for hospital antihypertensives is as follows- permissive HTN for now   IV Hydralazine for SBP>220, DBP>110    Will aim for controlled BP reduction by medications noted above. Monitor and mitigate end organ damage as indicated.    3/22/23  Okay to start antihypertensives  Lisinopril 20 mg daily    3/23/23  SBP in the 180s  Increase Lisinopril to 40 mg daily  Monitor    3/24/22  Better controlled this morning, but elevated throughout the day  Add amlodipine nightly and HCTZ    3/25/23  Hypertension improved with medication change      VTE Risk Mitigation (From admission, onward)         Ordered     enoxaparin injection 40 mg  Daily         03/21/23 2327     IP VTE HIGH RISK PATIENT  Once         03/21/23 2327     Place sequential compression device  Until discontinued         03/21/23 2327                Discharge Planning   MAMTA:      Code Status: Full Code   Is the patient medically ready for discharge?:     Reason for patient still in hospital (select all that apply): Treatment  Discharge Plan A: Rehab                  Elif Schuster NP  Department of Hospital Medicine   [place] - Med Surg

## 2023-03-25 NOTE — PLAN OF CARE
Problem: Adult Inpatient Plan of Care  Goal: Plan of Care Review  Outcome: Ongoing, Progressing     Problem: Cerebral Tissue Perfusion (Stroke, Ischemic/Transient Ischemic Attack)  Goal: Optimal Cerebral Tissue Perfusion  Outcome: Ongoing, Progressing     Problem: Functional Ability Impaired (Stroke, Ischemic/Transient Ischemic Attack)  Goal: Optimal Functional Ability  Outcome: Ongoing, Progressing     Problem: Sensorimotor Impairment (Stroke, Ischemic/Transient Ischemic Attack)  Goal: Improved Sensorimotor Function  Outcome: Ongoing, Progressing     Problem: Fall Injury Risk  Goal: Absence of Fall and Fall-Related Injury  Outcome: Ongoing, Progressing     Problem: Infection  Goal: Absence of Infection Signs and Symptoms  Outcome: Ongoing, Progressing    Discussed POC with pt, verbalized understanding.  Patient remains free from injury. Safety precautions maintained. No s/s of acute distress.  Purposeful rounding every two hours.   Cardiac monitoring in place, tele box number [id number]  Diet orders continued, pt diet: cardiac  Neuro checks q4 continued this shift.   Left sided passive range of motion continued this shift.   Chart and orders review completed. Pt education about care completed.

## 2023-03-25 NOTE — SUBJECTIVE & OBJECTIVE
Interval History: doing well. Awaiting placement. Participating in therapy    Review of Systems   Constitutional:  Negative for appetite change, chills, diaphoresis, fatigue and fever.   HENT:  Negative for congestion, nosebleeds, sore throat and trouble swallowing.    Eyes:  Negative for pain, discharge and visual disturbance.   Respiratory:  Negative for apnea, cough, chest tightness, shortness of breath, wheezing and stridor.    Cardiovascular:  Negative for chest pain, palpitations and leg swelling.   Gastrointestinal:  Negative for abdominal distention, abdominal pain, blood in stool, constipation, diarrhea, nausea and vomiting.   Endocrine: Negative for cold intolerance and heat intolerance.   Genitourinary:  Negative for difficulty urinating, dysuria, flank pain, frequency and urgency.   Musculoskeletal:  Negative for arthralgias, back pain, joint swelling, myalgias, neck pain and neck stiffness.   Skin:  Negative for rash and wound.   Allergic/Immunologic: Negative for food allergies and immunocompromised state.   Neurological:  Positive for facial asymmetry (left sided facial droop), speech difficulty and weakness (left sided). Negative for dizziness, seizures, syncope, light-headedness and headaches.   Hematological:  Negative for adenopathy.   Psychiatric/Behavioral:  Negative for agitation, behavioral problems and confusion. The patient is not nervous/anxious.        Objective:     Vital Signs (Most Recent):  Temp: 98.4 °F (36.9 °C) (03/25/23 1542)  Pulse: 80 (03/25/23 1542)  Resp: 18 (03/25/23 1542)  BP: (!) 169/80 (03/25/23 1542)  SpO2: 95 % (03/25/23 1542)   Vital Signs (24h Range):  Temp:  [98.4 °F (36.9 °C)-98.9 °F (37.2 °C)] 98.4 °F (36.9 °C)  Pulse:  [71-95] 80  Resp:  [18] 18  SpO2:  [94 %-99 %] 95 %  BP: (135-186)/(68-89) 169/80     Weight: 86.6 kg (190 lb 14.7 oz)  Body mass index is 27.39 kg/m².    Intake/Output Summary (Last 24 hours) at 3/25/2023 6923  Last data filed at 3/25/2023  1200  Gross per 24 hour   Intake 1020 ml   Output 825 ml   Net 195 ml      Physical Exam  Vitals and nursing note reviewed.   Constitutional:       Appearance: He is well-developed.   HENT:      Head: Normocephalic and atraumatic.      Nose: Nose normal.   Eyes:      General: No scleral icterus.  Cardiovascular:      Rate and Rhythm: Normal rate and regular rhythm.      Heart sounds: Normal heart sounds. No murmur heard.    No friction rub. No gallop.   Pulmonary:      Effort: Pulmonary effort is normal. No respiratory distress.      Breath sounds: Normal breath sounds. No wheezing.   Abdominal:      General: Bowel sounds are normal. There is no distension.      Palpations: Abdomen is soft.      Tenderness: There is no abdominal tenderness.   Musculoskeletal:         General: Normal range of motion.      Cervical back: Normal range of motion and neck supple.   Skin:     General: Skin is warm and dry.      Findings: No rash.   Neurological:      Mental Status: He is alert and oriented to person, place, and time.      Motor: Weakness (Left sided MS 4/5 to UE/BLE) present.      Comments: +left facial droop and Left arm drift    Psychiatric:         Behavior: Behavior normal.         Cognition and Memory: Impaired cognition: mild.       Significant Labs: All pertinent labs within the past 24 hours have been reviewed.    CMP:   Recent Labs   Lab 03/24/23  0907      K 3.4*   *   CO2 19*   *   BUN 25*   CREATININE 1.2   CALCIUM 9.5   ANIONGAP 14       Significant Imaging: I have reviewed all pertinent imaging results/findings within the past 24 hours.

## 2023-03-25 NOTE — PLAN OF CARE
Patient:   JONHNY GARCIA            MRN: [id number]            FIN: [id number]              Age:   89 years     Sex:  FEMALE     :  24   Associated Diagnoses:   None   Author:   MARCELINO BOLANOS     Health Status   Allergies:    Allergic Reactions (All)  Severity Not Documented  Codeine- No reactions were documented.  Norco- No reactions were documented.  Vicodin- Vomiting.   Current medications:    Medications (21) Active  Scheduled: (13)  Atorvastatin 20 mg tab  20 mg 1 tab, Oral, Q Bedtime  Carvedilol 3.125 mg tab  3.125 mg 1 tab, Oral, Q12H  Cholecalciferol 1,000 unit tab  2,000 unit 2 tab, Oral, QAM  Citalopram 20 mg tab  20 mg 1 tab, Oral, Daily  Ferrous sulfate 325 mg tab [Fe 65 mg]  325 mg 1 tab, Oral, Daily  Fluticasone-vilanterol 200-25 mcg inhalation powder 14s  1 puff, MDI/DPI, Daily  Indapamide 2.5 mg tab  2.5 mg 1 tab, Oral, QAM  Insulin human lispro 10 unit/0.1 mL inj  2-10 unit, Subcutaneous, QID [with meals & HS]  Levothyroxine 125 mcg tab  125 mcg 1 tab, Oral, QAM at 7  Losartan 50 mg tab  100 mg 2 tab, Oral, Q Evening  Multivitamin (ADEKS) cap  1 cap, Oral, QAM  Pantoprazole 40 mg DR tab  40 mg 1 tab, Oral, BID [before breakfast & dinner]  Sucralfate 1,000 mg tab  1,000 mg 1 tab, Oral, QID [before meals & HS]  Continuous: (0)  PRN: (8)  Acetaminophen 325 mg tab  650 mg 2 tab, Oral, Q4H  Albuterol HFA 90 mcg oral MDI 8 gm  90 mcg 1 puff, Inhaled, Q4H  Dextrose (glucose) 40% 15 gm/37.5 gm oral gel UD  15 gm, Oral, As Directed PRN  Dextrose (glucose) 50% 25 gm/50 mL syringe  12.5 gm 25 mL, IV Push, As Directed PRN  Glucagon 1 mg/1 mL emergency kit SDV  1 mg 1 mL, IM, As Directed PRN  HydrALAZINE 20 mg/1 mL inj SDV  10 mg 0.5 mL, Slow IV Push, Q6H  Nystatin powder 15 gm  1 application, Topical, BID  Polyethylene glycol 3350 oral recon powder 17 gm packet UD  17 gm 1 packet, Oral, Daily      Objective   VS/Measurements       Vitals between:   2019 22:09:56   TO   2019  PT SITTING IN BS CHAIR UPON ARRIVAL WITH MULTIPLE FAMILY MEMBERS IN ROOM    SIT<-->STAND WITH SBA WITH FAST MOVEMENT    AMBULATED WITH HHA ON HIS R SIDE 2 X 60' WITH CONVERSATION   22:09:56                   LAST RESULT MINIMUM MAXIMUM  Temperature 36.3 36.3 36.7  Heart Rate 59 52 61  Respiratory Rate 18 15 18  NISBP           145 138 149  NIDBP           69 59 80  SpO2                    92 92 94      Impression and Plan   Assessment and Plan:       Diagnosis: see dictation.

## 2023-03-25 NOTE — ASSESSMENT & PLAN NOTE
Patient has a current diagnosis of Hypertensive emergency with end organ damage evidenced by acute ischemic stroke which is controlled.  Latest blood pressure and vitals reviewed-   Temp:  [98.4 °F (36.9 °C)-98.9 °F (37.2 °C)]   Pulse:  [71-95]   Resp:  [18]   BP: (135-186)/(68-89)   SpO2:  [94 %-99 %] .   Patient currently off IV antihypertensives.   Home meds for hypertension were reviewed and noted below.       Medication adjustment for hospital antihypertensives is as follows- permissive HTN for now   IV Hydralazine for SBP>220, DBP>110    Will aim for controlled BP reduction by medications noted above. Monitor and mitigate end organ damage as indicated.    3/22/23  Okay to start antihypertensives  Lisinopril 20 mg daily    3/23/23  SBP in the 180s  Increase Lisinopril to 40 mg daily  Monitor    3/24/22  Better controlled this morning, but elevated throughout the day  Add amlodipine nightly and HCTZ    3/25/23  Hypertension improved with medication change

## 2023-03-26 LAB
ANION GAP SERPL CALC-SCNC: 11 MMOL/L (ref 8–16)
BASOPHILS # BLD AUTO: 0.05 K/UL (ref 0–0.2)
BASOPHILS NFR BLD: 0.7 % (ref 0–1.9)
BUN SERPL-MCNC: 32 MG/DL (ref 8–23)
CALCIUM SERPL-MCNC: 8.9 MG/DL (ref 8.7–10.5)
CHLORIDE SERPL-SCNC: 110 MMOL/L (ref 95–110)
CO2 SERPL-SCNC: 20 MMOL/L (ref 23–29)
CREAT SERPL-MCNC: 1.3 MG/DL (ref 0.5–1.4)
DIFFERENTIAL METHOD: ABNORMAL
EOSINOPHIL # BLD AUTO: 0.1 K/UL (ref 0–0.5)
EOSINOPHIL NFR BLD: 1.3 % (ref 0–8)
ERYTHROCYTE [DISTWIDTH] IN BLOOD BY AUTOMATED COUNT: 12.9 % (ref 11.5–14.5)
EST. GFR  (NO RACE VARIABLE): 56 ML/MIN/1.73 M^2
GLUCOSE SERPL-MCNC: 106 MG/DL (ref 70–110)
HCT VFR BLD AUTO: 42.4 % (ref 40–54)
HGB BLD-MCNC: 14.3 G/DL (ref 14–18)
IMM GRANULOCYTES # BLD AUTO: 0.03 K/UL (ref 0–0.04)
IMM GRANULOCYTES NFR BLD AUTO: 0.4 % (ref 0–0.5)
LYMPHOCYTES # BLD AUTO: 2 K/UL (ref 1–4.8)
LYMPHOCYTES NFR BLD: 28.8 % (ref 18–48)
MCH RBC QN AUTO: 31.4 PG (ref 27–31)
MCHC RBC AUTO-ENTMCNC: 33.7 G/DL (ref 32–36)
MCV RBC AUTO: 93 FL (ref 82–98)
MONOCYTES # BLD AUTO: 0.6 K/UL (ref 0.3–1)
MONOCYTES NFR BLD: 9 % (ref 4–15)
NEUTROPHILS # BLD AUTO: 4.1 K/UL (ref 1.8–7.7)
NEUTROPHILS NFR BLD: 59.8 % (ref 38–73)
NRBC BLD-RTO: 0 /100 WBC
PLATELET # BLD AUTO: 195 K/UL (ref 150–450)
PMV BLD AUTO: 10.1 FL (ref 9.2–12.9)
POTASSIUM SERPL-SCNC: 3.4 MMOL/L (ref 3.5–5.1)
RBC # BLD AUTO: 4.55 M/UL (ref 4.6–6.2)
SODIUM SERPL-SCNC: 141 MMOL/L (ref 136–145)
WBC # BLD AUTO: 6.91 K/UL (ref 3.9–12.7)

## 2023-03-26 PROCEDURE — 25000003 PHARM REV CODE 250: Performed by: NURSE PRACTITIONER

## 2023-03-26 PROCEDURE — 36415 COLL VENOUS BLD VENIPUNCTURE: CPT | Performed by: NURSE PRACTITIONER

## 2023-03-26 PROCEDURE — 92507 TX SP LANG VOICE COMM INDIV: CPT

## 2023-03-26 PROCEDURE — 85025 COMPLETE CBC W/AUTO DIFF WBC: CPT | Performed by: NURSE PRACTITIONER

## 2023-03-26 PROCEDURE — 80048 BASIC METABOLIC PNL TOTAL CA: CPT | Performed by: NURSE PRACTITIONER

## 2023-03-26 PROCEDURE — 63600175 PHARM REV CODE 636 W HCPCS: Performed by: NURSE PRACTITIONER

## 2023-03-26 PROCEDURE — 11000001 HC ACUTE MED/SURG PRIVATE ROOM

## 2023-03-26 PROCEDURE — 25000003 PHARM REV CODE 250: Performed by: INTERNAL MEDICINE

## 2023-03-26 PROCEDURE — 94761 N-INVAS EAR/PLS OXIMETRY MLT: CPT

## 2023-03-26 RX ADMIN — CLOPIDOGREL BISULFATE 75 MG: 75 TABLET ORAL at 09:03

## 2023-03-26 RX ADMIN — SENNOSIDES AND DOCUSATE SODIUM 1 TABLET: 50; 8.6 TABLET ORAL at 09:03

## 2023-03-26 RX ADMIN — ATORVASTATIN CALCIUM 40 MG: 40 TABLET, FILM COATED ORAL at 09:03

## 2023-03-26 RX ADMIN — MUPIROCIN: 20 OINTMENT TOPICAL at 09:03

## 2023-03-26 RX ADMIN — POLYETHYLENE GLYCOL 3350 17 G: 17 POWDER, FOR SOLUTION ORAL at 09:03

## 2023-03-26 RX ADMIN — ENOXAPARIN SODIUM 40 MG: 40 INJECTION SUBCUTANEOUS at 05:03

## 2023-03-26 RX ADMIN — AMLODIPINE BESYLATE 5 MG: 5 TABLET ORAL at 09:03

## 2023-03-26 RX ADMIN — ASPIRIN 81 MG: 81 TABLET, COATED ORAL at 09:03

## 2023-03-26 RX ADMIN — ACETAMINOPHEN 650 MG: 325 TABLET ORAL at 09:03

## 2023-03-26 RX ADMIN — ACETAMINOPHEN 650 MG: 325 TABLET ORAL at 10:03

## 2023-03-26 RX ADMIN — HYDROCHLOROTHIAZIDE 25 MG: 25 TABLET ORAL at 09:03

## 2023-03-26 RX ADMIN — LISINOPRIL 40 MG: 20 TABLET ORAL at 09:03

## 2023-03-26 NOTE — PT/OT/SLP PROGRESS
Speech Language Pathology Treatment    Patient Name:  Surendra Lundy   MRN:  [id number]  Admitting Diagnosis: Acute CVA (cerebrovascular accident)    Recommendations:                 General Recommendations:  Speech/language therapy and Cognitive-linguistic therapy  Diet recommendations:  Regular Diet - IDDSI Level 7, Liquid Diet Level: Thin liquids - IDDSI Level 0   Aspiration Precautions: Standard aspiration precautions   General Precautions: Standard, aspiration  Communication strategies:  none    Subjective     Patient alert, cooperative, and seen at bedside.  Patient goals: go to rehab     Pain/Comfort:  Pain Rating 1: 0/10  Pain Rating Post-Intervention 2: 0/10    Respiratory Status: Room air    Objective:     Has the patient been evaluated by SLP for swallowing?   Yes  Keep patient NPO? No   Current Respiratory Status:        Patient participation in structured conversational tasks. He exhibited tangential speech with occasionally word finding difficulties. ST to continue POC.    Assessment:     Surendra Lundy is a 77 y.o. male with an SLP diagnosis of (left) OM weakness/asymmetry, dysarthria of speech and mild cognitive impairment s/p confirmed CVA on MRI .  Pt consuming IDDSI 7 (regular solids) with IDDSI 0 (thin liquids) without incident, following aspiration precautions and daily oral care/hygiene.  Pt would benefit from continued ST post-acute/rehab for cognitive-communicative impairment.        Goals:   Multidisciplinary Problems       SLP Goals          Problem: SLP    Goal Priority Disciplines Outcome   SLP Goal     SLP Ongoing, Progressing   Description: 1.  Pt will consume IDDSI 7 (regular solids) with IDDSI 0 (thin liquids) without incident.    2.  Pt will improve cognitive-communicative skills to baseline level of function/independence related to speech intelligibility, memory recall and executive functioning.                       Plan:     Patient to be seen:  2 x/week   Plan of Care expires:   03/29/23  Plan of Care reviewed with:  patient   SLP Follow-Up:  Yes       Discharge recommendations:  rehabilitation facility       Time Tracking:     SLP Treatment Date:   03/26/23  Speech Start Time:  1021  Speech Stop Time:  1039     Speech Total Time (min):  18 min    Billable Minutes: Speech Therapy Individual 12    03/26/2023

## 2023-03-26 NOTE — PROGRESS NOTES
[facility] Medicine  Progress Note    Patient Name: Surendra Lundy  MRN: [id number]  Patient Class: IP- Inpatient   Admission Date: 3/21/2023  Length of Stay: 5 days  Attending Physician: Natividad Jung DO  Primary Care Provider: Eric Adler MD    Subjective:     Principal Problem:Acute CVA (cerebrovascular accident)        HPI:  The patient is a 76yo male with hx kidney stones and low back pain who presented to [facility] ED 3/21/23 with left-sided weakness and dizziness. Pt reports on 3/20/23 at appox 3pm, he noticed dizziness and loss of balance. When he awoke this am, he noticed he could barely get OOB due to left sided weakness and loss of balance. He spoke to his son who noticed slurred speech. He then went to ED for evaluation.   He said that he has not seen a physician in over 40 years. Pt reports symptoms improved since arrival to ED.      In the ED, /113. On exam there was weakness of the left face, PERNELL and LLE. CBC and CMP unremarkable.Troponin and proBNP WNL..CTH showed small vessel disease w/o acute changes.CXR clear. EKG NSR w/o ischemic changes.     Per vascular neurology (Dr. Carmona) patient has had a small vessel occlusion. TPA not indicated. The patient was given Plavix, Aspirin and IV Labetalol with improvement in his BP to 154/90.  He was weaned off the Labetalol drip. Vascular neurology recommended permissive HTN for now.   Pt was transferred to [facility] with diagnosis CVA, hypertensive emergency for Neurology evaluation and further work up.     The patient will be placed in observation under hospital medicine      Overview/Hospital Course:  Surendra Lundy is a 77 year old male who was admitted to [facility] for suspected CVA. Awaiting MRI for confirmation. He was seen by PT/O.T. who recommends dual antiplatelet and STATIN. Hypertension gradually controlled.     03/23/23  MRI brain showed right pontine acute/subacute stroke. Neurology consulted and recommended DAPT  for 30 days, then ASA alone, along with his antihypertensive medications.  PT/OT evaluated and recommended rehab placement. Pt is currently refusing placement to inpatient facility. He prefers to have outpatient PT/OT. Blood pressure remains elevated, SBP 180s. Restarted on home lisinopril yesterday with no improvement. Will increase lisinopril to 40 mg po daily and monitor.     03/24/23  Patient is agreable to rehab facility today. Antihypertensives optimized.    3/25/23  Hypertensive improved with medical management. Awaiting placement        Interval History: Ambulated in hallway with standby assistance.     Review of Systems   Constitutional:  Negative for appetite change, chills, diaphoresis, fatigue and fever.   HENT:  Negative for congestion, nosebleeds, sore throat and trouble swallowing.    Eyes:  Negative for pain, discharge and visual disturbance.   Respiratory:  Negative for apnea, cough, chest tightness, shortness of breath, wheezing and stridor.    Cardiovascular:  Negative for chest pain, palpitations and leg swelling.   Gastrointestinal:  Negative for abdominal distention, abdominal pain, blood in stool, constipation, diarrhea, nausea and vomiting.   Endocrine: Negative for cold intolerance and heat intolerance.   Genitourinary:  Negative for difficulty urinating, dysuria, flank pain, frequency and urgency.   Musculoskeletal:  Negative for arthralgias, back pain, joint swelling, myalgias, neck pain and neck stiffness.   Skin:  Negative for rash and wound.   Allergic/Immunologic: Negative for food allergies and immunocompromised state.   Neurological:  Positive for facial asymmetry (left sided facial droop), speech difficulty and weakness (left sided). Negative for dizziness, seizures, syncope, light-headedness and headaches.   Hematological:  Negative for adenopathy.   Psychiatric/Behavioral:  Negative for agitation, behavioral problems and confusion. The patient is not nervous/anxious.       Objective:     Vital Signs (Most Recent):  Temp: 98.6 °F (37 °C) (03/26/23 0854)  Pulse: 74 (03/26/23 0854)  Resp: 18 (03/26/23 0854)  BP: (!) 163/81 (03/26/23 0854)  SpO2: 98 % (03/26/23 0854)   Vital Signs (24h Range):  Temp:  [98.2 °F (36.8 °C)-98.6 °F (37 °C)] 98.6 °F (37 °C)  Pulse:  [60-90] 74  Resp:  [18] 18  SpO2:  [95 %-98 %] 98 %  BP: (148-175)/(73-89) 163/81     Weight: 86.6 kg (190 lb 14.7 oz)  Body mass index is 27.39 kg/m².    Intake/Output Summary (Last 24 hours) at 3/26/2023 1152  Last data filed at 3/26/2023 1043  Gross per 24 hour   Intake 450 ml   Output 800 ml   Net -350 ml      Physical Exam  Vitals and nursing note reviewed.   Constitutional:       Appearance: He is well-developed.   HENT:      Head: Normocephalic and atraumatic.      Nose: Nose normal.   Eyes:      General: No scleral icterus.  Cardiovascular:      Rate and Rhythm: Normal rate and regular rhythm.      Heart sounds: Normal heart sounds. No murmur heard.    No friction rub. No gallop.   Pulmonary:      Effort: Pulmonary effort is normal. No respiratory distress.      Breath sounds: Normal breath sounds. No wheezing.   Abdominal:      General: Bowel sounds are normal. There is no distension.      Palpations: Abdomen is soft.      Tenderness: There is no abdominal tenderness.   Musculoskeletal:         General: Normal range of motion.      Cervical back: Normal range of motion and neck supple.   Skin:     General: Skin is warm and dry.      Findings: No rash.   Neurological:      Mental Status: He is alert and oriented to person, place, and time.      Motor: Weakness (Left sided MS 4/5 to UE/BLE) present.      Comments: +left facial droop and Left arm drift    Psychiatric:         Behavior: Behavior normal.         Cognition and Memory: Impaired cognition: mild.     Significant Labs: All pertinent labs within the past 24 hours have been reviewed.  CBC:   Recent Labs   Lab 03/26/23  0715   WBC 6.91   HGB 14.3   HCT 42.4   PLT  195     CMP:   Recent Labs   Lab 03/26/23  0715      K 3.4*      CO2 20*      BUN 32*   CREATININE 1.3   CALCIUM 8.9   ANIONGAP 11       Significant Imaging: I have reviewed all pertinent imaging results/findings within the past 24 hours.      Assessment/Plan:      * Acute CVA (cerebrovascular accident)    Antithrombotics for secondary stroke prevention: Antiplatelets: Aspirin: 81 mg daily  Clopidogrel: 75 mg daily    Statins for secondary stroke prevention and hyperlipidemia, if present:   Statins: Atorvastatin- 40 mg daily    Aggressive risk factor modification: HTN     Rehab efforts: The patient has been evaluated by a stroke team provider and the therapy needs have been fully considered based off the presenting complaints and exam findings. The following therapy evaluations are needed: PT evaluate and treat, OT evaluate and treat, SLP evaluate and treat    Diagnostics ordered/pending: Carotid ultrasound to assess vasculature, HgbA1C to assess blood glucose levels, Lipid Profile to assess cholesterol levels, MRI head without contrast to assess brain parenchyma, TTE to assess cardiac function/status , TSH to assess thyroid function    VTE prophylaxis: Enoxaparin 40 mg SQ every 24 hours    BP parameters: TIA: SBP <220 until imaging confirmation of no infarct     MRI Brain pending.   CTA to assess intracranial involvement  PT/OT recommends rehab  Control blood pressure    3/23/23  MRI brain showed right pontine acute/subacute stroke  CTA head/neck with no acute findings, no significant stenosis or occlusion  Neurology consulted - recommended DAPT therapy x 30 days then ASA alone thereafter  PT/OT recommending rehab placement but patient currently refusing inpatient facility. He would like outpatient PT/OT upon discharge    3/24/23  Patient is agreeable to rehab facility    3/26/23  Awaiting insurance authorization to neuromedical rehab      Hypertensive urgency, malignant  Patient has a current  diagnosis of Hypertensive emergency with end organ damage evidenced by acute ischemic stroke which is controlled.  Latest blood pressure and vitals reviewed-   Temp:  [98.4 °F (36.9 °C)-98.9 °F (37.2 °C)]   Pulse:  [71-95]   Resp:  [18]   BP: (135-186)/(68-89)   SpO2:  [94 %-99 %] .   Patient currently off IV antihypertensives.   Home meds for hypertension were reviewed and noted below.       Medication adjustment for hospital antihypertensives is as follows- permissive HTN for now   IV Hydralazine for SBP>220, DBP>110    Will aim for controlled BP reduction by medications noted above. Monitor and mitigate end organ damage as indicated.    3/22/23  Okay to start antihypertensives  Lisinopril 20 mg daily    3/23/23  SBP in the 180s  Increase Lisinopril to 40 mg daily  Monitor    3/24/22  Better controlled this morning, but elevated throughout the day  Add amlodipine nightly and HCTZ    3/25/23  Hypertension improved with medication change      VTE Risk Mitigation (From admission, onward)         Ordered     enoxaparin injection 40 mg  Daily         03/21/23 2327     IP VTE HIGH RISK PATIENT  Once         03/21/23 2327     Place sequential compression device  Until discontinued         03/21/23 2327                Discharge Planning   MAMTA:      Code Status: Full Code   Is the patient medically ready for discharge?:     Reason for patient still in hospital (select all that apply): Treatment  Discharge Plan A: Rehab                  Elif Schuster NP  Department of Hospital Medicine   [facility] - Med Surg

## 2023-03-26 NOTE — PLAN OF CARE
Problem: Adult Inpatient Plan of Care  Goal: Plan of Care Review  Outcome: Ongoing, Progressing     Problem: Fall Injury Risk  Goal: Absence of Fall and Fall-Related Injury  Outcome: Ongoing, Progressing     Problem: Infection  Goal: Absence of Infection Signs and Symptoms  Outcome: Ongoing, Progressing     Problem: Respiratory Compromise (Stroke, Ischemic/Transient Ischemic Attack)  Goal: Effective Oxygenation and Ventilation  Outcome: Ongoing, Progressing     Problem: Functional Ability Impaired (Stroke, Ischemic/Transient Ischemic Attack)  Goal: Optimal Functional Ability  Outcome: Ongoing, Progressing    Discussed POC with pt, verbalized understanding.  Patient remains free from injury. Safety precautions maintained. No s/s of acute distress.  Purposeful rounding every two hours.   Pain controlled per MD order.   Cardiac monitoring in place, tele box number [id number]  Diet orders continued, pt diet: cardiac  Neuro checks q4 and passive ROM continued this shift.   Chart and orders review completed. Pt education about care completed.

## 2023-03-26 NOTE — PLAN OF CARE
VS stable. Pt resting comfortably. Purposeful rounding. Orders acknowledged. Pt education reviewed. No adverse events during shift. Continue POC.

## 2023-03-26 NOTE — SUBJECTIVE & OBJECTIVE
Interval History: Ambulated in hallway with standby assistance.     Review of Systems   Constitutional:  Negative for appetite change, chills, diaphoresis, fatigue and fever.   HENT:  Negative for congestion, nosebleeds, sore throat and trouble swallowing.    Eyes:  Negative for pain, discharge and visual disturbance.   Respiratory:  Negative for apnea, cough, chest tightness, shortness of breath, wheezing and stridor.    Cardiovascular:  Negative for chest pain, palpitations and leg swelling.   Gastrointestinal:  Negative for abdominal distention, abdominal pain, blood in stool, constipation, diarrhea, nausea and vomiting.   Endocrine: Negative for cold intolerance and heat intolerance.   Genitourinary:  Negative for difficulty urinating, dysuria, flank pain, frequency and urgency.   Musculoskeletal:  Negative for arthralgias, back pain, joint swelling, myalgias, neck pain and neck stiffness.   Skin:  Negative for rash and wound.   Allergic/Immunologic: Negative for food allergies and immunocompromised state.   Neurological:  Positive for facial asymmetry (left sided facial droop), speech difficulty and weakness (left sided). Negative for dizziness, seizures, syncope, light-headedness and headaches.   Hematological:  Negative for adenopathy.   Psychiatric/Behavioral:  Negative for agitation, behavioral problems and confusion. The patient is not nervous/anxious.      Objective:     Vital Signs (Most Recent):  Temp: 98.6 °F (37 °C) (03/26/23 0854)  Pulse: 74 (03/26/23 0854)  Resp: 18 (03/26/23 0854)  BP: (!) 163/81 (03/26/23 0854)  SpO2: 98 % (03/26/23 0854)   Vital Signs (24h Range):  Temp:  [98.2 °F (36.8 °C)-98.6 °F (37 °C)] 98.6 °F (37 °C)  Pulse:  [60-90] 74  Resp:  [18] 18  SpO2:  [95 %-98 %] 98 %  BP: (148-175)/(73-89) 163/81     Weight: 86.6 kg (190 lb 14.7 oz)  Body mass index is 27.39 kg/m².    Intake/Output Summary (Last 24 hours) at 3/26/2023 1152  Last data filed at 3/26/2023 1043  Gross per 24 hour    Intake 450 ml   Output 800 ml   Net -350 ml      Physical Exam  Vitals and nursing note reviewed.   Constitutional:       Appearance: He is well-developed.   HENT:      Head: Normocephalic and atraumatic.      Nose: Nose normal.   Eyes:      General: No scleral icterus.  Cardiovascular:      Rate and Rhythm: Normal rate and regular rhythm.      Heart sounds: Normal heart sounds. No murmur heard.    No friction rub. No gallop.   Pulmonary:      Effort: Pulmonary effort is normal. No respiratory distress.      Breath sounds: Normal breath sounds. No wheezing.   Abdominal:      General: Bowel sounds are normal. There is no distension.      Palpations: Abdomen is soft.      Tenderness: There is no abdominal tenderness.   Musculoskeletal:         General: Normal range of motion.      Cervical back: Normal range of motion and neck supple.   Skin:     General: Skin is warm and dry.      Findings: No rash.   Neurological:      Mental Status: He is alert and oriented to person, place, and time.      Motor: Weakness (Left sided MS 4/5 to UE/BLE) present.      Comments: +left facial droop and Left arm drift    Psychiatric:         Behavior: Behavior normal.         Cognition and Memory: Impaired cognition: mild.     Significant Labs: All pertinent labs within the past 24 hours have been reviewed.  CBC:   Recent Labs   Lab 03/26/23  0715   WBC 6.91   HGB 14.3   HCT 42.4        CMP:   Recent Labs   Lab 03/26/23  0715      K 3.4*      CO2 20*      BUN 32*   CREATININE 1.3   CALCIUM 8.9   ANIONGAP 11       Significant Imaging: I have reviewed all pertinent imaging results/findings within the past 24 hours.

## 2023-03-26 NOTE — ASSESSMENT & PLAN NOTE
Antithrombotics for secondary stroke prevention: Antiplatelets: Aspirin: 81 mg daily  Clopidogrel: 75 mg daily    Statins for secondary stroke prevention and hyperlipidemia, if present:   Statins: Atorvastatin- 40 mg daily    Aggressive risk factor modification: HTN     Rehab efforts: The patient has been evaluated by a stroke team provider and the therapy needs have been fully considered based off the presenting complaints and exam findings. The following therapy evaluations are needed: PT evaluate and treat, OT evaluate and treat, SLP evaluate and treat    Diagnostics ordered/pending: Carotid ultrasound to assess vasculature, HgbA1C to assess blood glucose levels, Lipid Profile to assess cholesterol levels, MRI head without contrast to assess brain parenchyma, TTE to assess cardiac function/status , TSH to assess thyroid function    VTE prophylaxis: Enoxaparin 40 mg SQ every 24 hours    BP parameters: TIA: SBP <220 until imaging confirmation of no infarct     MRI Brain pending.   CTA to assess intracranial involvement  PT/OT recommends rehab  Control blood pressure    3/23/23  MRI brain showed right pontine acute/subacute stroke  CTA head/neck with no acute findings, no significant stenosis or occlusion  Neurology consulted - recommended DAPT therapy x 30 days then ASA alone thereafter  PT/OT recommending rehab placement but patient currently refusing inpatient facility. He would like outpatient PT/OT upon discharge    3/24/23  Patient is agreeable to rehab facility    3/26/23  Awaiting insurance authorization to neuromedical rehab

## 2023-03-27 LAB
ANION GAP SERPL CALC-SCNC: 11 MMOL/L (ref 8–16)
BASOPHILS # BLD AUTO: 0.04 K/UL (ref 0–0.2)
BASOPHILS NFR BLD: 0.6 % (ref 0–1.9)
BUN SERPL-MCNC: 32 MG/DL (ref 8–23)
CALCIUM SERPL-MCNC: 9.2 MG/DL (ref 8.7–10.5)
CHLORIDE SERPL-SCNC: 107 MMOL/L (ref 95–110)
CO2 SERPL-SCNC: 23 MMOL/L (ref 23–29)
CREAT SERPL-MCNC: 1.4 MG/DL (ref 0.5–1.4)
DIFFERENTIAL METHOD: NORMAL
EOSINOPHIL # BLD AUTO: 0.1 K/UL (ref 0–0.5)
EOSINOPHIL NFR BLD: 2 % (ref 0–8)
ERYTHROCYTE [DISTWIDTH] IN BLOOD BY AUTOMATED COUNT: 12.6 % (ref 11.5–14.5)
EST. GFR  (NO RACE VARIABLE): 51 ML/MIN/1.73 M^2
GLUCOSE SERPL-MCNC: 105 MG/DL (ref 70–110)
HCT VFR BLD AUTO: 45.2 % (ref 40–54)
HGB BLD-MCNC: 14.6 G/DL (ref 14–18)
IMM GRANULOCYTES # BLD AUTO: 0.02 K/UL (ref 0–0.04)
IMM GRANULOCYTES NFR BLD AUTO: 0.3 % (ref 0–0.5)
LYMPHOCYTES # BLD AUTO: 2.2 K/UL (ref 1–4.8)
LYMPHOCYTES NFR BLD: 33.2 % (ref 18–48)
MCH RBC QN AUTO: 30.7 PG (ref 27–31)
MCHC RBC AUTO-ENTMCNC: 32.3 G/DL (ref 32–36)
MCV RBC AUTO: 95 FL (ref 82–98)
MONOCYTES # BLD AUTO: 0.6 K/UL (ref 0.3–1)
MONOCYTES NFR BLD: 8.7 % (ref 4–15)
NEUTROPHILS # BLD AUTO: 3.7 K/UL (ref 1.8–7.7)
NEUTROPHILS NFR BLD: 55.2 % (ref 38–73)
NRBC BLD-RTO: 0 /100 WBC
PLATELET # BLD AUTO: 217 K/UL (ref 150–450)
PMV BLD AUTO: 10.4 FL (ref 9.2–12.9)
POTASSIUM SERPL-SCNC: 4 MMOL/L (ref 3.5–5.1)
RBC # BLD AUTO: 4.75 M/UL (ref 4.6–6.2)
SODIUM SERPL-SCNC: 141 MMOL/L (ref 136–145)
WBC # BLD AUTO: 6.66 K/UL (ref 3.9–12.7)

## 2023-03-27 PROCEDURE — 85025 COMPLETE CBC W/AUTO DIFF WBC: CPT | Performed by: NURSE PRACTITIONER

## 2023-03-27 PROCEDURE — 11000001 HC ACUTE MED/SURG PRIVATE ROOM

## 2023-03-27 PROCEDURE — 25000003 PHARM REV CODE 250: Performed by: NURSE PRACTITIONER

## 2023-03-27 PROCEDURE — 80048 BASIC METABOLIC PNL TOTAL CA: CPT | Performed by: NURSE PRACTITIONER

## 2023-03-27 PROCEDURE — 97116 GAIT TRAINING THERAPY: CPT

## 2023-03-27 PROCEDURE — 36415 COLL VENOUS BLD VENIPUNCTURE: CPT | Performed by: NURSE PRACTITIONER

## 2023-03-27 PROCEDURE — 63600175 PHARM REV CODE 636 W HCPCS: Performed by: NURSE PRACTITIONER

## 2023-03-27 PROCEDURE — 97110 THERAPEUTIC EXERCISES: CPT

## 2023-03-27 PROCEDURE — 92507 TX SP LANG VOICE COMM INDIV: CPT

## 2023-03-27 RX ADMIN — ASPIRIN 81 MG: 81 TABLET, COATED ORAL at 09:03

## 2023-03-27 RX ADMIN — LISINOPRIL 40 MG: 20 TABLET ORAL at 09:03

## 2023-03-27 RX ADMIN — HYDROCHLOROTHIAZIDE 25 MG: 25 TABLET ORAL at 09:03

## 2023-03-27 RX ADMIN — AMLODIPINE BESYLATE 5 MG: 5 TABLET ORAL at 08:03

## 2023-03-27 RX ADMIN — ENOXAPARIN SODIUM 40 MG: 40 INJECTION SUBCUTANEOUS at 06:03

## 2023-03-27 RX ADMIN — ACETAMINOPHEN 650 MG: 325 TABLET ORAL at 09:03

## 2023-03-27 RX ADMIN — CLOPIDOGREL BISULFATE 75 MG: 75 TABLET ORAL at 09:03

## 2023-03-27 RX ADMIN — ATORVASTATIN CALCIUM 40 MG: 40 TABLET, FILM COATED ORAL at 09:03

## 2023-03-27 NOTE — PLAN OF CARE
TX COMPLETED: facilitated bed mobility with CGA, transfers with CGA, ambulated 50 ft min A with RW. Cont POC

## 2023-03-27 NOTE — SUBJECTIVE & OBJECTIVE
Review of Systems   All other systems reviewed and are negative.  Objective:     Vital Signs (Most Recent):  Temp: 98 °F (36.7 °C) (03/27/23 1130)  Pulse: 82 (03/27/23 1130)  Resp: 18 (03/27/23 1130)  BP: (!) 164/88 (03/27/23 1130)  SpO2: 97 % (03/27/23 1130)   Vital Signs (24h Range):  Temp:  [97.2 °F (36.2 °C)-98.6 °F (37 °C)] 98 °F (36.7 °C)  Pulse:  [55-82] 82  Resp:  [18] 18  SpO2:  [94 %-97 %] 97 %  BP: (150-166)/(78-91) 164/88     Weight: 86.6 kg (190 lb 14.7 oz)  Body mass index is 27.39 kg/m².    Intake/Output Summary (Last 24 hours) at 3/27/2023 1250  Last data filed at 3/27/2023 1015  Gross per 24 hour   Intake 270 ml   Output 950 ml   Net -680 ml      Physical Exam  Constitutional:       General: He is not in acute distress.     Appearance: Normal appearance.   Cardiovascular:      Rate and Rhythm: Normal rate and regular rhythm.      Heart sounds: No murmur heard.  Pulmonary:      Effort: Pulmonary effort is normal. No respiratory distress.      Breath sounds: Normal breath sounds. No wheezing.   Abdominal:      General: There is no distension.      Palpations: Abdomen is soft.      Tenderness: There is no abdominal tenderness.   Neurological:      Mental Status: He is alert.       Significant Labs: All pertinent labs within the past 24 hours have been reviewed.  CBC:   Recent Labs   Lab 03/26/23  0715 03/27/23  0649   WBC 6.91 6.66   HGB 14.3 14.6   HCT 42.4 45.2    217     CMP:   Recent Labs   Lab 03/26/23  0715 03/27/23  0649    141   K 3.4* 4.0    107   CO2 20* 23    105   BUN 32* 32*   CREATININE 1.3 1.4   CALCIUM 8.9 9.2   ANIONGAP 11 11       Significant Imaging: I have reviewed all pertinent imaging results/findings within the past 24 hours.    CTA Head and Neck (xpd)   Final Result      No acute intracranial abnormality.  Nonspecific white matter changes likely related to chronic microvascular ischemia.  Remote right pontine infarct.      No significant stenosis,  occlusion, dissection, aneurysm, or vascular malformation in the head or neck.      Bilateral hypoattenuating thyroid nodules, nonspecific.  Nonemergent dedicated thyroid ultrasound may be obtained if clinically indicated.      Multifocal odontogenic disease.         Electronically signed by: Froy Hickman   Date:    03/23/2023   Time:    12:07      MRI BRAIN WITHOUT CONTRAST   Final Result      Right pontine infarct acute or subacute         Electronically signed by: Jennifer Rg   Date:    03/22/2023   Time:    22:12      US Carotid Bilateral   Final Result      No clinically significant area of stenosis.      Validated velocity measurements with angiographic measurements, velocity criteria are extrapolated from diameter data as defined by the Society of Radiologists in Ultrasound Consensus Conference         Electronically signed by: Guy Strauss MD   Date:    03/22/2023   Time:    08:17

## 2023-03-27 NOTE — PROGRESS NOTES
[facility] Medicine  Progress Note    Patient Name: Surnedra Lundy  MRN: [id number]  Patient Class: IP- Inpatient   Admission Date: 3/21/2023  Length of Stay: 6 days  Attending Physician: Favio Velasco MD  Primary Care Provider: Eric Adler MD        Subjective:     Principal Problem:Acute CVA (cerebrovascular accident)        HPI:  The patient is a 78yo male with hx kidney stones and low back pain who presented to [facility] ED 3/21/23 with left-sided weakness and dizziness. Pt reports on 3/20/23 at appox 3pm, he noticed dizziness and loss of balance. When he awoke this am, he noticed he could barely get OOB due to left sided weakness and loss of balance. He spoke to his son who noticed slurred speech. He then went to ED for evaluation.   He said that he has not seen a physician in over 40 years. Pt reports symptoms improved since arrival to ED.      In the ED, /113. On exam there was weakness of the left face, PERNELL and LLE. CBC and CMP unremarkable.Troponin and proBNP WNL..CTH showed small vessel disease w/o acute changes.CXR clear. EKG NSR w/o ischemic changes.     Per vascular neurology (Dr. Carmona) patient has had a small vessel occlusion. TPA not indicated. The patient was given Plavix, Aspirin and IV Labetalol with improvement in his BP to 154/90.  He was weaned off the Labetalol drip. Vascular neurology recommended permissive HTN for now.   Pt was transferred to [facility] with diagnosis CVA, hypertensive emergency for Neurology evaluation and further work up.     The patient will be placed in observation under hospital medicine      Overview/Hospital Course:  Surendra Lundy is a 77 year old male who was admitted to [facility] for suspected CVA. Awaiting MRI for confirmation. He was seen by PT/O.T. who recommends dual antiplatelet and STATIN. Hypertension gradually controlled.     MRI brain showed right pontine acute/subacute stroke. Neurology consulted and recommended DAPT for  30 days, then ASA alone, along with his antihypertensive medications.  PT/OT evaluated and recommended rehab placement. Initially refused placement to inpatient facility. He prefers to have outpatient.  We discussed with family the importance of rehab in order to optimize recovery.  After further discussions with family, patient was subsequently agreeable to rehab placement.  Referral initiated, patient accepted at [facility] pending insurance authorization    3/27  NAEON. Patient with improvement in left sided weakness, rehab placement pending.        Review of Systems   All other systems reviewed and are negative.  Objective:     Vital Signs (Most Recent):  Temp: 98 °F (36.7 °C) (03/27/23 1130)  Pulse: 82 (03/27/23 1130)  Resp: 18 (03/27/23 1130)  BP: (!) 164/88 (03/27/23 1130)  SpO2: 97 % (03/27/23 1130)   Vital Signs (24h Range):  Temp:  [97.2 °F (36.2 °C)-98.6 °F (37 °C)] 98 °F (36.7 °C)  Pulse:  [55-82] 82  Resp:  [18] 18  SpO2:  [94 %-97 %] 97 %  BP: (150-166)/(78-91) 164/88     Weight: 86.6 kg (190 lb 14.7 oz)  Body mass index is 27.39 kg/m².    Intake/Output Summary (Last 24 hours) at 3/27/2023 1250  Last data filed at 3/27/2023 1015  Gross per 24 hour   Intake 270 ml   Output 950 ml   Net -680 ml      Physical Exam  Constitutional:       General: He is not in acute distress.     Appearance: Normal appearance.   Cardiovascular:      Rate and Rhythm: Normal rate and regular rhythm.      Heart sounds: No murmur heard.  Pulmonary:      Effort: Pulmonary effort is normal. No respiratory distress.      Breath sounds: Normal breath sounds. No wheezing.   Abdominal:      General: There is no distension.      Palpations: Abdomen is soft.      Tenderness: There is no abdominal tenderness.   Neurological:      Mental Status: He is alert.       Significant Labs: All pertinent labs within the past 24 hours have been reviewed.  CBC:   Recent Labs   Lab 03/26/23  0715 03/27/23  0649   WBC 6.91 6.66   HGB 14.3 14.6   HCT 42.4  45.2    217     CMP:   Recent Labs   Lab 03/26/23  0715 03/27/23  0649    141   K 3.4* 4.0    107   CO2 20* 23    105   BUN 32* 32*   CREATININE 1.3 1.4   CALCIUM 8.9 9.2   ANIONGAP 11 11       Significant Imaging: I have reviewed all pertinent imaging results/findings within the past 24 hours.    CTA Head and Neck (xpd)   Final Result      No acute intracranial abnormality.  Nonspecific white matter changes likely related to chronic microvascular ischemia.  Remote right pontine infarct.      No significant stenosis, occlusion, dissection, aneurysm, or vascular malformation in the head or neck.      Bilateral hypoattenuating thyroid nodules, nonspecific.  Nonemergent dedicated thyroid ultrasound may be obtained if clinically indicated.      Multifocal odontogenic disease.         Electronically signed by: Froy Hickman   Date:    03/23/2023   Time:    12:07      MRI BRAIN WITHOUT CONTRAST   Final Result      Right pontine infarct acute or subacute         Electronically signed by: Jennifer Rg   Date:    03/22/2023   Time:    22:12      US Carotid Bilateral   Final Result      No clinically significant area of stenosis.      Validated velocity measurements with angiographic measurements, velocity criteria are extrapolated from diameter data as defined by the Society of Radiologists in Ultrasound Consensus Conference         Electronically signed by: Guy Strauss MD   Date:    03/22/2023   Time:    08:17              Assessment/Plan:      * Acute CVA (cerebrovascular accident)    Antithrombotics for secondary stroke prevention: Antiplatelets: Aspirin: 81 mg daily  Clopidogrel: 75 mg daily    Statins for secondary stroke prevention and hyperlipidemia, if present:   Statins: Atorvastatin- 40 mg daily    Aggressive risk factor modification: HTN     Rehab efforts: The patient has been evaluated by a stroke team provider and the therapy needs have been fully considered based off the  presenting complaints and exam findings. The following therapy evaluations are needed: PT evaluate and treat, OT evaluate and treat, SLP evaluate and treat    Diagnostics ordered/pending: Carotid ultrasound to assess vasculature, HgbA1C to assess blood glucose levels, Lipid Profile to assess cholesterol levels, MRI head without contrast to assess brain parenchyma, TTE to assess cardiac function/status , TSH to assess thyroid function    VTE prophylaxis: Enoxaparin 40 mg SQ every 24 hours    BP parameters: TIA: SBP <220 until imaging confirmation of no infarct     MRI Brain pending.   CTA to assess intracranial involvement  PT/OT recommends rehab  Control blood pressure    3/23/23  MRI brain showed right pontine acute/subacute stroke  CTA head/neck with no acute findings, no significant stenosis or occlusion  Neurology consulted - recommended DAPT therapy x 30 days then ASA alone thereafter  PT/OT recommending rehab placement but patient currently refusing inpatient facility. He would like outpatient PT/OT upon discharge    3/24/23  Patient is agreeable to rehab facility    3/26/23  Awaiting insurance authorization to neuromedical rehab    3/27/23  Awaiting insurance authorization to neuromedical rehab    Hypertensive urgency, malignant  Patient has a current diagnosis of Hypertensive emergency with end organ damage evidenced by acute ischemic stroke which is controlled.  Latest blood pressure and vitals reviewed-   Temp:  [97.2 °F (36.2 °C)-98.6 °F (37 °C)]   Pulse:  [55-82]   Resp:  [18]   BP: (150-166)/(78-91)   SpO2:  [94 %-97 %] .   Patient currently off IV antihypertensives.   Home meds for hypertension were reviewed and noted below.       Medication adjustment for hospital antihypertensives is as follows- permissive HTN for now   IV Hydralazine for SBP>220, DBP>110    Will aim for controlled BP reduction by medications noted above. Monitor and mitigate end organ damage as indicated.    3/22/23  Okay to start  antihypertensives  Lisinopril 20 mg daily    3/23/23  SBP in the 180s  Increase Lisinopril to 40 mg daily  Monitor    3/24/23  Better controlled this morning, but elevated throughout the day  Add amlodipine nightly and HCTZ    3/25/23  Hypertension improved with medication change    3/27/23  BP gradually improving, stable on current PO regimen      VTE Risk Mitigation (From admission, onward)         Ordered     enoxaparin injection 40 mg  Daily         03/21/23 2327     IP VTE HIGH RISK PATIENT  Once         03/21/23 2327     Place sequential compression device  Until discontinued         03/21/23 2327                Discharge Planning   MAMTA:      Code Status: Full Code   Is the patient medically ready for discharge?:     Reason for patient still in hospital (select all that apply): Pending disposition  Discharge Plan A: Rehab                  Favio Velasco MD  Department of Hospital Medicine   '[place] - Med Surg

## 2023-03-27 NOTE — ASSESSMENT & PLAN NOTE
Patient has a current diagnosis of Hypertensive emergency with end organ damage evidenced by acute ischemic stroke which is controlled.  Latest blood pressure and vitals reviewed-   Temp:  [97.2 °F (36.2 °C)-98.6 °F (37 °C)]   Pulse:  [55-82]   Resp:  [18]   BP: (150-166)/(78-91)   SpO2:  [94 %-97 %] .   Patient currently off IV antihypertensives.   Home meds for hypertension were reviewed and noted below.       Medication adjustment for hospital antihypertensives is as follows- permissive HTN for now   IV Hydralazine for SBP>220, DBP>110    Will aim for controlled BP reduction by medications noted above. Monitor and mitigate end organ damage as indicated.    3/22/23  Okay to start antihypertensives  Lisinopril 20 mg daily    3/23/23  SBP in the 180s  Increase Lisinopril to 40 mg daily  Monitor    3/24/23  Better controlled this morning, but elevated throughout the day  Add amlodipine nightly and HCTZ    3/25/23  Hypertension improved with medication change    3/27/23  BP gradually improving, stable on current PO regimen

## 2023-03-27 NOTE — PLAN OF CARE
Discussed poc with pt, pt verbalized understanding    Purposeful rounding every 2hours    VS wnl  Cardiac monitoring in use, pt is NSR, tele monitor # [id number]  Fall precautions in place, remains injury free  Pain under control with PRN meds    Accurate I&Os  Bed locked at lowest position  Call light within reach    Chart check complete  Will cont with POC

## 2023-03-27 NOTE — PLAN OF CARE
[facility] - Med Surg  Discharge Reassessment    Primary Care Provider: Eric Adler MD    Expected Discharge Date:     Reassessment (most recent)       Discharge Reassessment - 03/27/23 0845          Discharge Reassessment    Assessment Type Discharge Planning Reassessment     Did the patient's condition or plan change since previous assessment? No     Discharge Plan discussed with: Patient;Adult children     Communicated MAMTA with patient/caregiver Date not available/Unable to determine     Discharge Plan A Rehab     DME Needed Upon Discharge  none

## 2023-03-27 NOTE — ASSESSMENT & PLAN NOTE
Antithrombotics for secondary stroke prevention: Antiplatelets: Aspirin: 81 mg daily  Clopidogrel: 75 mg daily    Statins for secondary stroke prevention and hyperlipidemia, if present:   Statins: Atorvastatin- 40 mg daily    Aggressive risk factor modification: HTN     Rehab efforts: The patient has been evaluated by a stroke team provider and the therapy needs have been fully considered based off the presenting complaints and exam findings. The following therapy evaluations are needed: PT evaluate and treat, OT evaluate and treat, SLP evaluate and treat    Diagnostics ordered/pending: Carotid ultrasound to assess vasculature, HgbA1C to assess blood glucose levels, Lipid Profile to assess cholesterol levels, MRI head without contrast to assess brain parenchyma, TTE to assess cardiac function/status , TSH to assess thyroid function    VTE prophylaxis: Enoxaparin 40 mg SQ every 24 hours    BP parameters: TIA: SBP <220 until imaging confirmation of no infarct     MRI Brain pending.   CTA to assess intracranial involvement  PT/OT recommends rehab  Control blood pressure    3/23/23  MRI brain showed right pontine acute/subacute stroke  CTA head/neck with no acute findings, no significant stenosis or occlusion  Neurology consulted - recommended DAPT therapy x 30 days then ASA alone thereafter  PT/OT recommending rehab placement but patient currently refusing inpatient facility. He would like outpatient PT/OT upon discharge    3/24/23  Patient is agreeable to rehab facility    3/26/23  Awaiting insurance authorization to neuromedical rehab    3/27/23  Awaiting insurance authorization to neuromedical rehab

## 2023-03-27 NOTE — PT/OT/SLP PROGRESS
"Physical Therapy  Treatment    Surendra Lundy   MRN: [id number]   Admitting Diagnosis: Acute CVA (cerebrovascular accident)       PT Start Time: 1115     PT Stop Time: 1138    PT Total Time (min): 23 min       Billable Minutes:  Gait Training 15 and Therapeutic Exercise 8    Treatment Type: Treatment  PT/PTA: PT     Number of PTA visits since last PT visit: 0       General Precautions: Standard, fall  Orthopedic Precautions: N/A  Braces: N/A  Respiratory Status: Room air    Spiritual, Cultural Beliefs, Anabaptism Practices, Values that Affect Care: no    Subjective:  Communicated with nursing (Aliza) and performed chart review via epic prior to session.  Pt agreeable to PT services    Pain/Comfort  Pain Rating 1:  ("no pain when I'm lying down" per pt referring to chronic back pain)    Objective:   Patient found with: peripheral IV, telemetry    Functional Mobility:  Bed Mobility:    Facilitated supine <> sit and scooting forward with CGA    Transfers:   Sit<>stand with CGA HHA, cues for safety and hand placement, stand pivot with CGA/min A for safety and RW management    Gait:    Gait training 50ft min A with RW, demonstrates slow pace, inconsistency in step quality (at times exaggerated hip and knee flex, other times shuffled steps), unsteadiness on feet particularly during weight shifting and turns      Balance:   Dynamic stand: POOR+: Needs MIN (minimal ) assist during gait       Treatment and Education:  Educated pt on benefits of consistent participation in PT services to meet functional goals. Educated pt on standing therex to promote strength and joint mobility. Exercises included heel raises, hamstring curls, marching x 7- 10 reps with CGA/min A and RW. Educated pt on call don't fall policy and use of call button to alert nursing staff of needs (including to assist with returning back to bed). Pt expressed understanding.       AM-PAC 6 CLICK MOBILITY  How much help from another person does this patient " currently need?   1 = Unable, Total/Dependent Assistance  2 = A lot, Maximum/Moderate Assistance  3 = A little, Minimum/Contact Guard/Supervision  4 = None, Modified [place]/Independent    Turning over in bed (including adjusting bedclothes, sheets and blankets)?: 3  Sitting down on and standing up from a chair with arms (e.g., wheelchair, bedside commode, etc.): 3  Moving from lying on back to sitting on the side of the bed?: 3  Moving to and from a bed to a chair (including a wheelchair)?: 3  Need to walk in hospital room?: 3  Climbing 3-5 steps with a railing?: 1  Basic Mobility Total Score: 16    AM-PAC Raw Score CMS G-Code Modifier Level of Impairment Assistance   6 % Total / Unable   7 - 9 CM 80 - 100% Maximal Assist   10 - 14 CL 60 - 80% Moderate Assist   15 - 19 CK 40 - 60% Moderate Assist   20 - 22 CJ 20 - 40% Minimal Assist   23 CI 1-20% SBA / CGA   24 CH 0% Independent/ Mod I     Patient left supine with all lines intact, call button in reach, bed alarm on, and nursing notified.    Assessment:  Surendra Lundy is a 77 y.o. male with a medical diagnosis of Acute CVA (cerebrovascular accident) and presents with impairments listed below which negatively impact function. Pt able to demonstrate gait activity with RW but deficits increase risk of falls. Pt will benefit from continued PT services to promote functional status and safety awareness.    Rehab identified problem list/impairments: weakness, impaired endurance, impaired functional mobility, decreased safety awareness, decreased lower extremity function, decreased coordination    Rehab potential is good.    Activity tolerance: Good    Discharge recommendations: rehabilitation facility      Barriers to discharge:      Equipment recommendations: none     GOALS:   Multidisciplinary Problems       Physical Therapy Goals          Problem: Physical Therapy    Goal Priority Disciplines Outcome Goal Variances Interventions   Physical Therapy Goal      PT, PT/OT Ongoing, Progressing     Description: LT23  1. PT WILL COMPLETE BED MOBILITY IND  2. PT WILL GT TRAIN X 150' WITH RW AND SBA WITH INC L LE SWING   3. PT WILL T/F TO CHAIR WITH RW AND SBA                       PLAN:    Patient to be seen 3 x/week to address the above listed problems via neuromuscular re-education, therapeutic activities, gait training, therapeutic exercises  Plan of Care expires: 23  Plan of Care reviewed with: patient         2023

## 2023-03-27 NOTE — PLAN OF CARE
Problem: Adult Inpatient Plan of Care  Goal: Plan of Care Review  Outcome: Ongoing, Progressing     Problem: Sensorimotor Impairment (Stroke, Ischemic/Transient Ischemic Attack)  Goal: Improved Sensorimotor Function  Outcome: Ongoing, Progressing     Problem: Functional Ability Impaired (Stroke, Ischemic/Transient Ischemic Attack)  Goal: Optimal Functional Ability  Outcome: Ongoing, Progressing     Problem: Fall Injury Risk  Goal: Absence of Fall and Fall-Related Injury  Outcome: Ongoing, Progressing     Problem: Infection  Goal: Absence of Infection Signs and Symptoms  Outcome: Ongoing, Progressing     Discussed POC with pt, verbalized understanding.  Patient remains free from injury. Safety precautions maintained. No s/s of acute distress.  Purposeful rounding every two hours.   Pain controlled per MD order.   Cardiac monitoring in place, tele box number [id number]  Diet orders continued, pt diet: cardiac  Neuro checks q4 continued this shift.   Chart and orders review completed. Pt education about care completed.

## 2023-03-27 NOTE — PLAN OF CARE
03/27/23 1106   Post-Acute Status   Post-Acute Authorization Placement   Post-Acute Placement Status Pending payor review/awaiting authorization (if required)   Coverage [employer]   Discharge Plan   Discharge Plan A Rehab     Pending auth to [facility].

## 2023-03-28 VITALS
SYSTOLIC BLOOD PRESSURE: 119 MMHG | BODY MASS INDEX: 26.99 KG/M2 | TEMPERATURE: 98 F | WEIGHT: 188.5 LBS | RESPIRATION RATE: 18 BRPM | OXYGEN SATURATION: 97 % | HEART RATE: 69 BPM | HEIGHT: 70 IN | DIASTOLIC BLOOD PRESSURE: 68 MMHG

## 2023-03-28 PROCEDURE — 97530 THERAPEUTIC ACTIVITIES: CPT | Mod: CQ

## 2023-03-28 PROCEDURE — 97530 THERAPEUTIC ACTIVITIES: CPT

## 2023-03-28 PROCEDURE — 25000003 PHARM REV CODE 250: Performed by: NURSE PRACTITIONER

## 2023-03-28 PROCEDURE — 97535 SELF CARE MNGMENT TRAINING: CPT

## 2023-03-28 PROCEDURE — 97116 GAIT TRAINING THERAPY: CPT | Mod: CQ

## 2023-03-28 RX ORDER — ASPIRIN 81 MG/1
81 TABLET ORAL DAILY
Refills: 0
Start: 2023-03-29 | End: 2024-03-28

## 2023-03-28 RX ORDER — TALC
6 POWDER (GRAM) TOPICAL NIGHTLY PRN
Refills: 0
Start: 2023-03-28

## 2023-03-28 RX ORDER — LISINOPRIL 40 MG/1
40 TABLET ORAL DAILY
Qty: 90 TABLET | Refills: 3
Start: 2023-03-29 | End: 2024-03-28

## 2023-03-28 RX ORDER — AMLODIPINE BESYLATE 5 MG/1
5 TABLET ORAL NIGHTLY
Qty: 30 TABLET | Refills: 11
Start: 2023-03-28 | End: 2024-03-27

## 2023-03-28 RX ORDER — HYDROCHLOROTHIAZIDE 25 MG/1
25 TABLET ORAL DAILY
Qty: 30 TABLET | Refills: 11
Start: 2023-03-29 | End: 2024-03-28

## 2023-03-28 RX ORDER — CLOPIDOGREL BISULFATE 75 MG/1
75 TABLET ORAL DAILY
Qty: 22 TABLET | Refills: 0
Start: 2023-03-29 | End: 2023-04-20

## 2023-03-28 RX ORDER — ATORVASTATIN CALCIUM 40 MG/1
40 TABLET, FILM COATED ORAL DAILY
Qty: 90 TABLET | Refills: 3
Start: 2023-03-29 | End: 2024-03-28

## 2023-03-28 RX ADMIN — ASPIRIN 81 MG: 81 TABLET, COATED ORAL at 09:03

## 2023-03-28 RX ADMIN — CLOPIDOGREL BISULFATE 75 MG: 75 TABLET ORAL at 09:03

## 2023-03-28 RX ADMIN — ATORVASTATIN CALCIUM 40 MG: 40 TABLET, FILM COATED ORAL at 09:03

## 2023-03-28 RX ADMIN — HYDROCHLOROTHIAZIDE 25 MG: 25 TABLET ORAL at 09:03

## 2023-03-28 RX ADMIN — LISINOPRIL 40 MG: 20 TABLET ORAL at 09:03

## 2023-03-28 NOTE — PT/OT/SLP PROGRESS
Speech Language Pathology Treatment    Patient Name:  Surendra Lundy   MRN:  [id number]  Admitting Diagnosis: Acute CVA (cerebrovascular accident)    Recommendations:                 General Recommendations:  Cognitive-linguistic therapy  Diet recommendations:  Regular Diet - IDDSI Level 7, Liquid Diet Level: Thin liquids - IDDSI Level 0   Aspiration Precautions: Frequent oral care and Standard aspiration precautions   General Precautions: Standard, aspiration  Communication strategies:  none    Subjective     Pt seen bedside for ST.  No c/o pain.  No family present.  Awaiting rehab placement.  Patient goals: to go home    Pain/Comfort:  0/10    Respiratory Status: Room air    Objective:     Has the patient been evaluated by SLP for swallowing?   Yes  Keep patient NPO? No   Current Respiratory Status: room air    Marked improvement in cognitive-communicative ability since initial ST evaluation.  No dysarthria appreciated today; however, mild left sided facial asymmetry persists.    Improved communication in conversational speech tasks and during recent recall tasks.  Cognition related to awareness/problem solving also improving with mild deficits noted on recent diagnostic assessment (CLQT).    Assessment:     Surendra Lundy is a 77 y.o. male with an SLP diagnosis of  He presents with (left) OM weakness/asymmetry & mild cognitive-communicative impairment s/p confirmed CVA on MRI. 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 Patient would benefit from continued ST post-acute/rehab for cognitive-communicative impairment.     Goals:   Multidisciplinary Problems       SLP Goals          Problem: SLP    Goal Priority Disciplines Outcome   SLP Goal     SLP Ongoing, Progressing   Description: 1.  Pt will consume IDDSI 7 (regular solids) with IDDSI 0 (thin liquids) without  incident.    2.  Pt will improve cognitive-communicative skills to baseline level of function/independence related to speech intelligibility, memory recall and executive functioning.                       Plan:     Patient to be seen:  2 x/week, 3 x/week   Plan of Care expires:  03/29/23  Plan of Care reviewed with:  patient   SLP Follow-Up:  Yes       Discharge recommendations:  rehabilitation facility   Barriers to Discharge:  None    Time Tracking:     SLP Treatment Date:   03/27/23  Speech Start Time:  1230  Speech Stop Time:  1300     Speech Total Time (min):  30 min    Billable Minutes: Speech Therapy Individual 30 minutes    03/27/2023

## 2023-03-28 NOTE — PLAN OF CARE
[facility] - Med Surg  Discharge Final Note    Primary Care Provider: Eric Adler MD    Expected Discharge Date: 3/28/2023    Final Discharge Note (most recent)       Final Note - 03/28/23 1029          Final Note    Assessment Type Final Discharge Note     Anticipated Discharge Disposition Rehab Facility        Post-Acute Status    Post-Acute Authorization Placement     Post-Acute Placement Status Set-up Complete/Auth obtained     Coverage human[employer]                     Important Message from Medicare  Important Message from Medicare regarding Discharge Appeal Rights: Signed/date by patient/caregiver     Date IMM was signed: 03/28/23  Time IMM was signed: 1018    After-discharge care                Destination       [facility],    Service: Inpatient Rehabilitation    [id number] [facility] [place] [address]   Phone: [phone]

## 2023-03-28 NOTE — PT/OT/SLP PROGRESS
Physical Therapy  Treatment    Surendra Lundy   MRN: [id number]   Admitting Diagnosis: Acute CVA (cerebrovascular accident)    PT Received On: 03/28/23  PT Start Time: 0750     PT Stop Time: 0815    PT Total Time (min): 25 min       Billable Minutes:  Gait Training 10 and Therapeutic Activity 15    Treatment Type: Treatment  PT/PTA: PTA     Number of PTA visits since last PT visit: 1       General Precautions: Standard, fall  Orthopedic Precautions: N/A  Braces: N/A  Respiratory Status: Room air    Spiritual, Cultural Beliefs, Anabaptism Practices, Values that Affect Care: no    Subjective:  Communicated with charge nurse, Va, and completed Epic chart review prior to session.  Patient agreed to PT session.     Pain/Comfort  Pain Rating 1: 0/10  Pain Rating Post-Intervention 1: 0/10    Objective:   Patient found with: telemetry, peripheral IV, chair check, Other (comments) (CHAIR ALARM NOT ACTIVE UPON ENTRY INTO ROOM)    Patient found sitting up in chair.     STS from chair No AD: SBA (patient stood quickly without being instructed to do so)    50ft x3 trials w/ RW CGA (intermittent VC for safety w/ RW mgmt and to remain within RANDAL of AD    Stand pivot T/F to chair w/ RW: CGA (VC for safety and sequencing of task)    Standing AROM TE to BLE w/ BUE self support on RW 2x10: Calf raises, mini squats, alternating march  CGA to maintain balance for all    Educated patient on importance of increased tolerance to upright position and direct impact on CV endurance and strength. Patient encouraged to sit up in chair/ EOB, for a minimum of 2 consecutive hours, 3x per day. Encouraged patient to perform AROM TE to BLE throughout the day within all available planes of motion. Re enforced importance of utilizing call light to meet needs in room and not attempt to get up without staff assistance. Patient verbalized understanding and agreed to comply.       AM-PAC 6 CLICK MOBILITY  How much help from another person does this patient  currently need?   1 = Unable, Total/Dependent Assistance  2 = A lot, Maximum/Moderate Assistance  3 = A little, Minimum/Contact Guard/Supervision  4 = None, Modified Lucas/Independent    Turning over in bed (including adjusting bedclothes, sheets and blankets)?: 3  Sitting down on and standing up from a chair with arms (e.g., wheelchair, bedside commode, etc.): 3  Moving from lying on back to sitting on the side of the bed?: 3  Moving to and from a bed to a chair (including a wheelchair)?: 3  Need to walk in hospital room?: 3  Climbing 3-5 steps with a railing?: 1  Basic Mobility Total Score: 16    AM-PAC Raw Score CMS G-Code Modifier Level of Impairment Assistance   6 % Total / Unable   7 - 9 CM 80 - 100% Maximal Assist   10 - 14 CL 60 - 80% Moderate Assist   15 - 19 CK 40 - 60% Moderate Assist   20 - 22 CJ 20 - 40% Minimal Assist   23 CI 1-20% SBA / CGA   24 CH 0% Independent/ Mod I     Patient left up in chair with call button in reach and chair alarm on.    Assessment:  Surendra Lundy is a 77 y.o. male with a medical diagnosis of Acute CVA (cerebrovascular accident) and presents with overall decline in functional mobility. Patient would continue to benefit from skilled PT to address functional limitations listed below in order to return to PLOF/decrease caregiver burden.      Rehab identified problem list/impairments: weakness, impaired endurance, impaired self care skills, decreased safety awareness, decreased lower extremity function, impaired balance, decreased coordination    Rehab potential is good.    Activity tolerance: Good    Discharge recommendations: rehabilitation facility      Barriers to discharge:      Equipment recommendations: to be determined by next level of care     GOALS:   Multidisciplinary Problems       Physical Therapy Goals          Problem: Physical Therapy    Goal Priority Disciplines Outcome Goal Variances Interventions   Physical Therapy Goal     PT, PT/OT Ongoing,  Progressing     Description: LT23  1. PT WILL COMPLETE BED MOBILITY IND  2. PT WILL GT TRAIN X 150' WITH RW AND SBA WITH INC L LE SWING   3. PT WILL T/F TO CHAIR WITH RW AND SBA                       PLAN:    Patient to be seen 3 x/week to address the above listed problems via gait training, therapeutic activities, therapeutic exercises  Plan of Care expires: 23  Plan of Care reviewed with: patient         2023

## 2023-03-28 NOTE — ASSESSMENT & PLAN NOTE
Patient has a current diagnosis of Hypertensive emergency with end organ damage evidenced by acute ischemic stroke which is controlled.  Latest blood pressure and vitals reviewed-   Temp:  [97.9 °F (36.6 °C)-98.6 °F (37 °C)]   Pulse:  [57-82]   Resp:  [16-19]   BP: (146-164)/(72-89)   SpO2:  [94 %-97 %] .   Patient currently off IV antihypertensives.   Home meds for hypertension were reviewed and noted below.       Medication adjustment for hospital antihypertensives is as follows- permissive HTN for now   IV Hydralazine for SBP>220, DBP>110    Will aim for controlled BP reduction by medications noted above. Monitor and mitigate end organ damage as indicated.    3/22/23  Okay to start antihypertensives  Lisinopril 20 mg daily    3/23/23  SBP in the 180s  Increase Lisinopril to 40 mg daily  Monitor    3/24/23  Better controlled this morning, but elevated throughout the day  Add amlodipine nightly and HCTZ    3/25/23  Hypertension improved with medication change    3/27/23  BP gradually improving, stable on current PO regimen

## 2023-03-28 NOTE — PLAN OF CARE
03/28/23 1028   Medicare Message   Important Message from Medicare regarding Discharge Appeal Rights Signed/date by patient/caregiver   Date IMM was signed 03/28/23   Time IMM was signed 1018

## 2023-03-28 NOTE — PLAN OF CARE
DISCHARGE INSTRUCTIONS REVIEWED WITH PATIENT AND FAMILY AT BEDSIDE WITH VERBALIZATION OF UNDERSTANDING.  SALINE LOCK REMOVED FROM RIGHT ARM AND LEFT ARM WITH PRESSURE DRESSING APPLIED.  PATIENT TRANSPORTED TO VEHICLE PER W/C ACCOMPANIED BY STAFF.

## 2023-03-28 NOTE — PLAN OF CARE
AAOx4. Neuro checks Q4. Cardiac monitoring. Pt remained free from fall and injury. No sign of any distress noted. Safety precautions alert. Pt asked to call for assistance if needed. IV access clean dry and intact saline locked. Chart checked reviewed. VSS. Plan of care continued.

## 2023-04-01 NOTE — DISCHARGE SUMMARY
[facility] Medicine  Discharge Summary      Patient Name: Surendra Lundy  MRN: [id number]  LINK: [id number]  Patient Class: IP- Inpatient  Admission Date: 3/21/2023  Hospital Length of Stay: 7 days  Discharge Date and Time: 3/28/2023  1:43 PM  Attending Physician: No att. providers found   Discharging Provider: Favio Velasco MD  Primary Care Provider: Eric Adler MD    Primary Care Team: Networked reference to record PCT     HPI:   The patient is a 76yo male with hx kidney stones and low back pain who presented to [facility] ED 3/21/23 with left-sided weakness and dizziness. Pt reports on 3/20/23 at appox 3pm, he noticed dizziness and loss of balance. When he awoke this am, he noticed he could barely get OOB due to left sided weakness and loss of balance. He spoke to his son who noticed slurred speech. He then went to ED for evaluation.   He said that he has not seen a physician in over 40 years. Pt reports symptoms improved since arrival to ED.      In the ED, /113. On exam there was weakness of the left face, PERNELL and LLE. CBC and CMP unremarkable.Troponin and proBNP WNL..CTH showed small vessel disease w/o acute changes.CXR clear. EKG NSR w/o ischemic changes.     Per vascular neurology (Dr. Carmona) patient has had a small vessel occlusion. TPA not indicated. The patient was given Plavix, Aspirin and IV Labetalol with improvement in his BP to 154/90.  He was weaned off the Labetalol drip. Vascular neurology recommended permissive HTN for now.   Pt was transferred to [facility] with diagnosis CVA, hypertensive emergency for Neurology evaluation and further work up.     The patient will be placed in observation under hospital medicine      * No surgery found *      Hospital Course:   Surendra Lundy is a 77 year old male who was admitted to [facility] for suspected CVA. Awaiting MRI for confirmation. He was seen by PT/O.T. who recommends dual antiplatelet and STATIN. Hypertension  gradually controlled.     MRI brain showed right pontine acute/subacute stroke. Neurology consulted and recommended DAPT for 30 days, then ASA alone, along with his antihypertensive medications.  PT/OT evaluated and recommended rehab placement. Initially refused placement to inpatient facility. He prefers to have outpatient.  We discussed with family the importance of rehab in order to optimize recovery.  After further discussions with family, patient was subsequently agreeable to rehab placement.  Referral initiated, patient accepted at [facility] pending insurance authorization    3/27  NESSA. Patient with improvement in left sided weakness, rehab placement pending.    3/28  Remains stable, rehab placement approved.  Patient to continue Plavix and ASA x 30 days, till 4/20/23, then continue ASA alone       Goals of Care Treatment Preferences:  Code Status: Full Code      Consults:   Consults (From admission, onward)        Status Ordering Provider     Inpatient consult to Social Work  Once        Provider:  (Not yet assigned)    Completed TAAREA, BELTRAN     Inpatient consult to Social Work  Once        Provider:  (Not yet assigned)    Completed TAAREA, BELTRAN     Inpatient consult to Neurology  Once        Provider:  Axel Mathis MD    Completed LISA KESSLER     IP consult to case management  Once        Provider:  (Not yet assigned)    Completed TAAREA, BELTRAN     Inpatient consult to Registered Dietitian/Nutritionist  Once        Provider:  (Not yet assigned)    Completed JAM KING          Neuro  * Acute CVA (cerebrovascular accident)    Antithrombotics for secondary stroke prevention: Antiplatelets: Aspirin: 81 mg daily  Clopidogrel: 75 mg daily    Statins for secondary stroke prevention and hyperlipidemia, if present:   Statins: Atorvastatin- 40 mg daily    Aggressive risk factor modification: HTN     Rehab efforts: The patient has been evaluated by a stroke team provider and the therapy needs have been  fully considered based off the presenting complaints and exam findings. The following therapy evaluations are needed: PT evaluate and treat, OT evaluate and treat, SLP evaluate and treat    Diagnostics ordered/pending: Carotid ultrasound to assess vasculature, HgbA1C to assess blood glucose levels, Lipid Profile to assess cholesterol levels, MRI head without contrast to assess brain parenchyma, TTE to assess cardiac function/status , TSH to assess thyroid function    VTE prophylaxis: Enoxaparin 40 mg SQ every 24 hours    BP parameters: TIA: SBP <220 until imaging confirmation of no infarct     MRI Brain pending.   CTA to assess intracranial involvement  PT/OT recommends rehab  Control blood pressure    3/23/23  MRI brain showed right pontine acute/subacute stroke  CTA head/neck with no acute findings, no significant stenosis or occlusion  Neurology consulted - recommended DAPT therapy x 30 days then ASA alone thereafter  PT/OT recommending rehab placement but patient currently refusing inpatient facility. He would like outpatient PT/OT upon discharge    3/24/23  Patient is agreeable to rehab facility    3/26/23  Awaiting insurance authorization to neuromedical rehab    3/27/23  Awaiting insurance authorization to neuromedical rehab      Cardiac/Vascular  Hypertensive urgency, malignant  Patient has a current diagnosis of Hypertensive emergency with end organ damage evidenced by acute ischemic stroke which is controlled.  Latest blood pressure and vitals reviewed-   Temp:  [97.9 °F (36.6 °C)-98.6 °F (37 °C)]   Pulse:  [57-82]   Resp:  [16-19]   BP: (146-164)/(72-89)   SpO2:  [94 %-97 %] .   Patient currently off IV antihypertensives.   Home meds for hypertension were reviewed and noted below.       Medication adjustment for hospital antihypertensives is as follows- permissive HTN for now   IV Hydralazine for SBP>220, DBP>110    Will aim for controlled BP reduction by medications noted above. Monitor and mitigate end  organ damage as indicated.    3/22/23  Okay to start antihypertensives  Lisinopril 20 mg daily    3/23/23  SBP in the 180s  Increase Lisinopril to 40 mg daily  Monitor    3/24/23  Better controlled this morning, but elevated throughout the day  Add amlodipine nightly and HCTZ    3/25/23  Hypertension improved with medication change    3/27/23  BP gradually improving, stable on current PO regimen      Final Active Diagnoses:    Diagnosis Date Noted POA    PRINCIPAL PROBLEM:  Acute CVA (cerebrovascular accident) [I63.9] 03/22/2023 Yes    Hypertensive urgency, malignant [I16.0] 03/22/2023 Yes      Problems Resolved During this Admission:       Discharged Condition: stable    Disposition: Rehab Facility    Follow Up:   Contact information for after-discharge care     Destination     [facility]    Service: Inpatient Rehabilitation  Contact information:  [id number] [facility] [id number] [phone]                           Patient Instructions:   No discharge procedures on file.    Significant Diagnostic Studies: Labs: CMP No results for input(s): NA, K, CL, CO2, GLU, BUN, CREATININE, CALCIUM, PROT, ALBUMIN, BILITOT, ALKPHOS, AST, ALT, ANIONGAP, ESTGFRAFRICA, EGFRNONAA in the last 48 hours. and CBC No results for input(s): WBC, HGB, HCT, PLT in the last 48 hours.    Pending Diagnostic Studies:     None         Medications:  Reconciled Home Medications:      Medication List      START taking these medications    amLODIPine 5 MG tablet  Commonly known as: NORVASC  Take 1 tablet (5 mg total) by mouth every evening.     aspirin 81 MG EC tablet  Commonly known as: ECOTRIN  Take 1 tablet (81 mg total) by mouth once daily.     atorvastatin 40 MG tablet  Commonly known as: LIPITOR  Take 1 tablet (40 mg total) by mouth once daily.     clopidogreL 75 mg tablet  Commonly known as: PLAVIX  Take 1 tablet (75 mg total) by mouth once daily. for 22 days      hydroCHLOROthiazide 25 MG tablet  Commonly known as: HYDRODIURIL  Take 1 tablet (25 mg total) by mouth once daily.     lisinopriL 40 MG tablet  Commonly known as: PRINIVIL,ZESTRIL  Take 1 tablet (40 mg total) by mouth once daily.     melatonin 3 mg tablet  Commonly known as: MELATIN  Take 2 tablets (6 mg total) by mouth nightly as needed for Insomnia.        CONTINUE taking these medications    acetaminophen 325 MG tablet  Commonly known as: TYLENOL  Take 325 mg by mouth every 6 (six) hours as needed for Pain.            Indwelling Lines/Drains at time of discharge:   Lines/Drains/Airways     None                 Time spent on the discharge of patient: > 30   minutes         Favio Velasco MD  Department of Hospital Medicine  [facility] - Med Surg

## 2023-04-03 ENCOUNTER — TELEPHONE (OUTPATIENT)
Dept: PHYSICAL MEDICINE AND REHAB | Facility: CLINIC | Age: 78
End: 2023-04-03
Payer: MEDICARE

## 2023-04-03 NOTE — TELEPHONE ENCOUNTER
Patient on weekly [employer] Core Measures Report. Patient has been discharged to an outside rehab facility. No phone call placed at this time.  Andressa Song RN

## 2023-07-03 PROBLEM — I63.9 ACUTE CVA (CEREBROVASCULAR ACCIDENT): Status: RESOLVED | Noted: 2023-03-22 | Resolved: 2023-07-03
